# Patient Record
Sex: MALE | Race: AMERICAN INDIAN OR ALASKA NATIVE | NOT HISPANIC OR LATINO | Employment: OTHER | URBAN - METROPOLITAN AREA
[De-identification: names, ages, dates, MRNs, and addresses within clinical notes are randomized per-mention and may not be internally consistent; named-entity substitution may affect disease eponyms.]

---

## 2019-03-09 ENCOUNTER — HOSPITAL ENCOUNTER (INPATIENT)
Facility: HOSPITAL | Age: 54
LOS: 6 days | Discharge: HOME | DRG: 918 | End: 2019-03-15
Attending: INTERNAL MEDICINE | Admitting: INTERNAL MEDICINE
Payer: MEDICARE

## 2019-03-09 DIAGNOSIS — F20.9 SCHIZOPHRENIA, UNSPECIFIED TYPE: ICD-10-CM

## 2019-03-09 DIAGNOSIS — T50.904D DRUG OVERDOSE, UNDETERMINED INTENT, SUBSEQUENT ENCOUNTER: ICD-10-CM

## 2019-03-09 DIAGNOSIS — T14.91XA SUICIDE ATTEMPT: ICD-10-CM

## 2019-03-09 PROBLEM — T65.92XA SUICIDE ATTEMPT BY SUBSTANCE OVERDOSE: Status: ACTIVE | Noted: 2019-03-09

## 2019-03-09 PROBLEM — T50.901A OVERDOSE: Status: ACTIVE | Noted: 2019-03-09

## 2019-03-09 LAB
CHOLEST SERPL-MCNC: 194 MG/DL
CHOLEST/HDLC SERPL: 4 {RATIO}
ESTIMATED AVG GLUCOSE: 114 MG/DL
FOLATE SERPL-MCNC: 10 NG/ML
HBA1C MFR BLD HPLC: 5.6 %
HDLC SERPL-MCNC: 49 MG/DL
HDLC SERPL: 25.3 %
LDLC SERPL CALC-MCNC: 108.2 MG/DL
NONHDLC SERPL-MCNC: 145 MG/DL
T3 SERPL-MCNC: 135 NG/DL
T4 FREE SERPL-MCNC: 1.03 NG/DL
TRIGL SERPL-MCNC: 184 MG/DL
TSH SERPL DL<=0.005 MIU/L-ACNC: 2.41 UIU/ML
VIT B12 SERPL-MCNC: 295 PG/ML

## 2019-03-09 PROCEDURE — 84425 ASSAY OF VITAMIN B-1: CPT

## 2019-03-09 PROCEDURE — 99222 PR INITIAL HOSPITAL CARE,LEVL II: ICD-10-PCS | Mod: AI,GC,, | Performed by: INTERNAL MEDICINE

## 2019-03-09 PROCEDURE — 80061 LIPID PANEL: CPT

## 2019-03-09 PROCEDURE — 84439 ASSAY OF FREE THYROXINE: CPT

## 2019-03-09 PROCEDURE — 82607 VITAMIN B-12: CPT

## 2019-03-09 PROCEDURE — 87110 CHLAMYDIA CULTURE: CPT

## 2019-03-09 PROCEDURE — 12400001 HC PSYCH SEMI-PRIVATE ROOM

## 2019-03-09 PROCEDURE — 87535 HIV-1 PROBE&REVERSE TRNSCRPJ: CPT

## 2019-03-09 PROCEDURE — 84480 ASSAY TRIIODOTHYRONINE (T3): CPT

## 2019-03-09 PROCEDURE — 83036 HEMOGLOBIN GLYCOSYLATED A1C: CPT

## 2019-03-09 PROCEDURE — 82746 ASSAY OF FOLIC ACID SERUM: CPT

## 2019-03-09 PROCEDURE — 99222 1ST HOSP IP/OBS MODERATE 55: CPT | Mod: AI,GC,, | Performed by: INTERNAL MEDICINE

## 2019-03-09 PROCEDURE — 84443 ASSAY THYROID STIM HORMONE: CPT

## 2019-03-09 PROCEDURE — 86592 SYPHILIS TEST NON-TREP QUAL: CPT

## 2019-03-09 RX ORDER — OLANZAPINE 10 MG/2ML
10 INJECTION, POWDER, FOR SOLUTION INTRAMUSCULAR ONCE AS NEEDED
Status: DISCONTINUED | OUTPATIENT
Start: 2019-03-09 | End: 2019-03-15 | Stop reason: HOSPADM

## 2019-03-09 RX ORDER — DOCUSATE SODIUM 100 MG/1
100 CAPSULE, LIQUID FILLED ORAL DAILY PRN
Status: DISCONTINUED | OUTPATIENT
Start: 2019-03-09 | End: 2019-03-15 | Stop reason: HOSPADM

## 2019-03-09 RX ORDER — CALCIUM CARBONATE 200(500)MG
1000 TABLET,CHEWABLE ORAL 3 TIMES DAILY PRN
Status: DISCONTINUED | OUTPATIENT
Start: 2019-03-09 | End: 2019-03-15 | Stop reason: HOSPADM

## 2019-03-09 RX ORDER — IBUPROFEN 200 MG
1 TABLET ORAL DAILY PRN
Status: DISCONTINUED | OUTPATIENT
Start: 2019-03-09 | End: 2019-03-11

## 2019-03-09 RX ORDER — HYDROXYZINE PAMOATE 50 MG/1
50 CAPSULE ORAL NIGHTLY PRN
Status: DISCONTINUED | OUTPATIENT
Start: 2019-03-09 | End: 2019-03-09

## 2019-03-09 RX ORDER — HYDROXYZINE PAMOATE 25 MG/1
25 CAPSULE ORAL EVERY 6 HOURS PRN
Status: DISCONTINUED | OUTPATIENT
Start: 2019-03-09 | End: 2019-03-09

## 2019-03-09 RX ORDER — LOPERAMIDE HYDROCHLORIDE 2 MG/1
2 CAPSULE ORAL 4 TIMES DAILY PRN
Status: DISCONTINUED | OUTPATIENT
Start: 2019-03-09 | End: 2019-03-15 | Stop reason: HOSPADM

## 2019-03-09 RX ORDER — CALCIUM CARBONATE 200(500)MG
1000 TABLET,CHEWABLE ORAL EVERY 8 HOURS PRN
Status: DISCONTINUED | OUTPATIENT
Start: 2019-03-09 | End: 2019-03-09

## 2019-03-09 RX ORDER — ACETAMINOPHEN 325 MG/1
650 TABLET ORAL EVERY 6 HOURS PRN
Status: DISCONTINUED | OUTPATIENT
Start: 2019-03-09 | End: 2019-03-09

## 2019-03-09 RX ORDER — ACETAMINOPHEN 325 MG/1
650 TABLET ORAL EVERY 6 HOURS PRN
Status: DISCONTINUED | OUTPATIENT
Start: 2019-03-09 | End: 2019-03-15 | Stop reason: HOSPADM

## 2019-03-09 RX ORDER — OLANZAPINE 10 MG/1
10 TABLET ORAL EVERY 8 HOURS PRN
Status: DISCONTINUED | OUTPATIENT
Start: 2019-03-09 | End: 2019-03-15 | Stop reason: HOSPADM

## 2019-03-09 RX ORDER — LOPERAMIDE HYDROCHLORIDE 2 MG/1
2 CAPSULE ORAL
Status: DISCONTINUED | OUTPATIENT
Start: 2019-03-09 | End: 2019-03-09

## 2019-03-09 NOTE — SUBJECTIVE & OBJECTIVE
Patient History           Medical as of 3/9/2019     Past Medical History     Diagnosis Date Comments Source    Anxiety -- -- Provider    Arthritis -- -- Provider    Depression -- -- Provider    History of psychiatric hospitalization -- -- Provider    Hx of psychiatric care -- -- Provider    Hypertension -- -- Provider    Psychiatric exam requested by authority -- -- Provider    Psychiatric problem -- -- Provider    Suicide attempt -- -- Provider    Therapy -- -- Provider                  Surgical as of 3/9/2019     Past Surgical History     Procedure Laterality Date Comments Source    MANDIBLE FRACTURE SURGERY -- -- -- Provider                  Family as of 3/9/2019     Problem Relation Name Age of Onset Comments Source    Hyperlipidemia Mother -- -- -- Provider    Arthritis Mother -- -- -- Provider    Heart disease Father -- -- -- Provider    Depression Father -- -- -- Provider    Arthritis Father -- -- -- Provider            Tobacco Use as of 3/9/2019     Smoking Status Smoking Start Date Smoking Quit Date Packs/Day Years Used    Current Every Day Smoker -- -- 1.00 15.00    Types Comments Smokeless Tobacco Status Smokeless Tobacco Quit Date Source     Cigarettes -- Unknown -- Provider            Alcohol Use as of 3/9/2019     Alcohol Use Drinks/Week Alcohol/Week Comments Source    Not Asked -- -- -- Provider    Frequency Standard Drinks Binge Drinking Source      -- -- -- Provider             Drug Use as of 3/9/2019     Drug Use Types Frequency Comments Source    Yes  Amphetamines, Marijuana -- -- Provider            Sexual Activity as of 3/9/2019     Sexually Active Birth Control Partners Comments Source    Not Asked -- -- -- Provider            Activities of Daily Living as of 3/9/2019     Activities of Daily Living Question Response Comments Source    Patient feels they ought to cut down on drinking/drug use No -- Provider    Patient annoyed by others criticizing their drinking/drug use No -- Provider     Patient has felt bad or guilty about drinking/drug use No -- Provider    Patient has had a drink/used drugs as an eye opener in the AM No -- Provider            Social Documentation as of 3/9/2019    None           Occupational as of 3/9/2019    None           Socioeconomic as of 3/9/2019     Marital Status Spouse Name Number of Children Years Education Education Level Preferred Language Ethnicity Race Source     -- -- -- -- English  () or Alaskan  Indian or  Provider    Financial Resource Strain Food Insecurity: Worry Food Insecurity: Inability Transportation Needs: Medical Transportation Needs: Non-medical       -- -- -- -- --             Pertinent History     Question Response Comments    Lives with friends --    Place in Birth Order -- --    Lives in home --    Number of Siblings -- --    Raised by biological parents --    Legal Involvement none --    Childhood Trauma early trauma --    Criminal History of arrest --    Financial Status disabled --    Highest Level of Education unfinished highschool --    Does patient have access to a firearm? -- --     Service No --    Primary Leisure Activity -- --    Spirituality non-practicing --        Past Medical History:   Diagnosis Date    Anxiety     Arthritis     Depression     History of psychiatric hospitalization     Hx of psychiatric care     Hypertension     Psychiatric exam requested by authority     Psychiatric problem     Suicide attempt     Therapy      Past Surgical History:   Procedure Laterality Date    MANDIBLE FRACTURE SURGERY       Family History     Problem Relation (Age of Onset)    Arthritis Mother, Father    Depression Father    Heart disease Father    Hyperlipidemia Mother        Tobacco Use    Smoking status: Current Every Day Smoker     Packs/day: 1.00     Years: 15.00     Pack years: 15.00     Types: Cigarettes   Substance and Sexual Activity    Alcohol use: Not  on file    Drug use: Yes     Types: Amphetamines, Marijuana    Sexual activity: Not on file     Review of patient's allergies indicates:   Allergen Reactions    Lyrica [pregabalin] Hives     And abd pain    Penicillins Hives    Antihistamines - alkylamine Anxiety    Promethazine Anxiety    Vistaril [hydroxyzine hcl] Anxiety       No current facility-administered medications on file prior to encounter.      Current Outpatient Medications on File Prior to Encounter   Medication Sig    alprazolam (XANAX) 0.5 MG tablet Take 0.5 mg by mouth 2 (two) times daily.    atenolol (TENORMIN) 50 MG tablet Take 50 mg by mouth every morning.    fluoxetine (PROZAC) 20 MG capsule Take 20 mg by mouth nightly.    gabapentin (NEURONTIN) 300 MG capsule Take 1 capsule (300 mg total) by mouth 3 (three) times daily.    lisinopril-hydrochlorothiazide (PRINZIDE,ZESTORETIC) 10-12.5 mg per tablet     mirtazapine (REMERON) 15 MG tablet     risperidone (RISPERDAL) 2 MG tablet      Psychotherapeutics (From admission, onward)    None        Review of Systems   Constitutional: Negative for chills and fever.   HENT: Negative for congestion and sore throat.    Eyes: Negative for discharge and visual disturbance.   Respiratory: Negative for cough and shortness of breath.    Cardiovascular: Negative for chest pain and palpitations.   Gastrointestinal: Negative for abdominal pain, diarrhea, nausea and vomiting.   Musculoskeletal: Negative for back pain and neck pain.   Skin: Negative for rash and wound.   Neurological: Negative for dizziness, tremors, weakness and headaches.     Strengths and Liabilities: Strength: Patient has positive support network., Liability: Patient has poor judgment    Objective:     Vital Signs (Most Recent):    Vital Signs (24h Range):  Temp:  [98.2 °F (36.8 °C)] 98.2 °F (36.8 °C)  Pulse:  [80] 80  Resp:  [18] 18  SpO2:  [98 %] 98 %  BP: (129)/(82) 129/82           There is no height or weight on file to calculate  "BMI.    No intake or output data in the 24 hours ending 03/09/19 1131    Physical Exam   Constitutional: He appears well-developed and well-nourished.   HENT:   Head: Normocephalic and atraumatic.   Eyes: EOM are normal. Pupils are equal, round, and reactive to light.   Cardiovascular: Normal rate, regular rhythm, normal heart sounds and intact distal pulses.   Pulmonary/Chest: Effort normal and breath sounds normal.   Abdominal: Soft. Bowel sounds are normal. He exhibits no distension.   Neurological: Gait normal.   Skin: Skin is warm and dry.   Psychiatric:   Mental Status Exam:  Appearance: poor hygiene and grooming, dressed in hospital scrubs, NAD, appears stated age   Behavior/Cooperation: calm, cooperative  Language: fluent english  Speech: rapid, garbled  Mood: "just want to go home"  Affect: full, reactive, appropriate  Thought Process: linear, logical, goal-directed  Thought Content:  denies SI/HI/paranoia/delusions; no objective evidence of paranoia or delusions  Perception: vague AVH  Orientation: grossly intact  Memory: intact to conversation  Attention Span/Concentration: intact to conversation  Fund of Knowledge: appropriate for education level  Insight: limited  Judgment: poor       NEUROLOGICAL EXAMINATION:     CRANIAL NERVES     CN II   Visual fields full to confrontation.     CN III, IV, VI   Pupils are equal, round, and reactive to light.  Extraocular motions are normal.     CN V   Facial sensation intact.     CN VII   Facial expression full, symmetric.     CN VIII   CN VIII normal.     CN IX, X   CN IX normal.   CN X normal.     CN XI   CN XI normal.     CN XII   CN XII normal.     GAIT AND COORDINATION     Gait  Gait: normal    Significant Labs: No results found for this or any previous visit (from the past 48 hour(s)).   No results found for: PHENYTOIN, PHENOBARB, VALPROATE, CBMZ      Significant Imaging: I have reviewed all pertinent imaging results/findings within the past 24 hours.  "
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
## 2019-03-09 NOTE — ASSESSMENT & PLAN NOTE
"Was dx with Schizophrenia about 10 years ago, and reports trial of Haldol, Invega Sustenna, Abilify IM, Risperdal, Zyprexa, Latuda, Geodon, Seroquel but denies any of these meds being efficacious. States that he will takes meds for a few months but then "they feel like poison and the medicine is out to get me". Does reports "jumping and shaking" side effects with some of the medications but is unable to report which specific meds caused this.     On interview, patient reports vague AVH.    Plan to initiate antipsychotic (preferrably MACK due to noncompliance) but will hold for now in light of recent overdose.  "
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
## 2019-03-09 NOTE — PLAN OF CARE
03/09/19 1100   [facility] Group Therapy   Group Name Medication   Participation Level Appropriate   Participation Quality Cooperative   Insight/Motivation Improved   Affect/Mood Display Appropriate   Cognition Alert

## 2019-03-09 NOTE — NURSING
Pt arrives to [facility] with EMS personel. Pt is alert, awake, oriented. He is calm and compliant. He is dressed in a hospital gown. Pt is searched for contraband by [facility], no contraband found. New wrist band and allergy band applied. Pt reports that he has no suicidal intent. He denies SI/HI/AV hallucinations. He reports that he took 30 remeron pills last night to help him sleep. He denies it was a suicide attempt. However, he endorses previous suicide attempt and hx of depression with polysubstance abuse. He reports occasional meth use and daily marijuna use. Pt is educated on policies and procedures. His items are itemized by [facility].  notified of arrival. PEC faxed to 's office. MD notified that pt will require new in [place] PEC.
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
## 2019-03-09 NOTE — PROGRESS NOTES
03/09/19 0900   [facility] Group Therapy   Group Name Therapeutic Recreation   Specific Interventions Social Skills Training   Participation Level None

## 2019-03-09 NOTE — PROGRESS NOTES
03/09/19 1030   [facility] Group Therapy   Group Name Therapeutic Recreation   Specific Interventions Cognitive Stimulation Training   Participation Level None
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
## 2019-03-10 PROCEDURE — 99232 PR SUBSEQUENT HOSPITAL CARE,LEVL II: ICD-10-PCS | Mod: GC,,, | Performed by: INTERNAL MEDICINE

## 2019-03-10 PROCEDURE — 25000003 PHARM REV CODE 250: Performed by: PSYCHIATRY & NEUROLOGY

## 2019-03-10 PROCEDURE — 12400001 HC PSYCH SEMI-PRIVATE ROOM

## 2019-03-10 PROCEDURE — 99232 SBSQ HOSP IP/OBS MODERATE 35: CPT | Mod: GC,,, | Performed by: INTERNAL MEDICINE

## 2019-03-10 PROCEDURE — 87491 CHLMYD TRACH DNA AMP PROBE: CPT

## 2019-03-10 RX ORDER — ARIPIPRAZOLE 10 MG/1
10 TABLET ORAL NIGHTLY
Status: DISCONTINUED | OUTPATIENT
Start: 2019-03-10 | End: 2019-03-15 | Stop reason: HOSPADM

## 2019-03-10 RX ADMIN — ARIPIPRAZOLE 10 MG: 10 TABLET ORAL at 08:03

## 2019-03-10 RX ADMIN — THERA TABS 1 TABLET: TAB at 08:03

## 2019-03-10 NOTE — PROGRESS NOTES
03/10/19 0900 03/10/19 1000 03/10/19 1100   [facility] Group Therapy   Group Name Community Reintegration Mental Awareness Stress Management   Specific Interventions Current Events Cognitive Stimulation Training Guided Imagery/Relaxation   Participation Level Minimal None Active;Supportive;Appropriate   Participation Quality Cooperative;Requires Prompting Withdrawn Cooperative   Insight/Motivation Improved --  Improved   Affect/Mood Display Appropriate --  Appropriate   Cognition Alert --  Alert       03/10/19 1200 03/10/19 1400   [facility] Group Therapy   Group Name Education Therapeutic Recreation   Specific Interventions --  Skilled Activity Creative Expression   Participation Level Appropriate Active;Appropriate   Participation Quality Cooperative Cooperative;Social   Insight/Motivation Limited Improved   Affect/Mood Display Appropriate Appropriate   Cognition Alert Alert
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
## 2019-03-10 NOTE — PLAN OF CARE
03/10/19 1200   [facility] Group Therapy   Group Name Education   Participation Level Appropriate   Participation Quality Cooperative   Insight/Motivation Limited   Affect/Mood Display Appropriate   Cognition Alert
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
## 2019-03-10 NOTE — SUBJECTIVE & OBJECTIVE
Patient History           Medical as of 3/10/2019     Past Medical History     Diagnosis Date Comments Source    Anxiety -- -- Provider    Arthritis -- -- Provider    Depression -- -- Provider    History of psychiatric hospitalization -- -- Provider    Hx of psychiatric care -- -- Provider    Hypertension -- -- Provider    Psychiatric exam requested by authority -- -- Provider    Psychiatric problem -- -- Provider    Suicide attempt -- -- Provider    Therapy -- -- Provider                  Surgical as of 3/10/2019     Past Surgical History     Procedure Laterality Date Comments Source    MANDIBLE FRACTURE SURGERY -- -- -- Provider                  Family as of 3/10/2019     Problem Relation Name Age of Onset Comments Source    Hyperlipidemia Mother -- -- -- Provider    Arthritis Mother -- -- -- Provider    Heart disease Father -- -- -- Provider    Depression Father -- -- -- Provider    Arthritis Father -- -- -- Provider            Tobacco Use as of 3/10/2019     Smoking Status Smoking Start Date Smoking Quit Date Packs/Day Years Used    Current Every Day Smoker -- -- 1.00 15.00    Types Comments Smokeless Tobacco Status Smokeless Tobacco Quit Date Source     Cigarettes -- Unknown -- Provider            Alcohol Use as of 3/10/2019     Alcohol Use Drinks/Week Alcohol/Week Comments Source    Not Asked -- -- -- Provider    Frequency Standard Drinks Binge Drinking Source      -- -- -- Provider             Drug Use as of 3/10/2019     Drug Use Types Frequency Comments Source    Yes  Amphetamines, Marijuana -- -- Provider            Sexual Activity as of 3/10/2019     Sexually Active Birth Control Partners Comments Source    Not Asked -- -- -- Provider            Activities of Daily Living as of 3/10/2019     Activities of Daily Living Question Response Comments Source    Patient feels they ought to cut down on drinking/drug use No -- Provider    Patient annoyed by others criticizing their drinking/drug use No --  Provider    Patient has felt bad or guilty about drinking/drug use No -- Provider    Patient has had a drink/used drugs as an eye opener in the AM No -- Provider            Social Documentation as of 3/10/2019    None           Occupational as of 3/10/2019    None           Socioeconomic as of 3/10/2019     Marital Status Spouse Name Number of Children Years Education Education Level Preferred Language Ethnicity Race Source     -- -- -- -- English  () or Alaskan  Indian or  Provider    Financial Resource Strain Food Insecurity: Worry Food Insecurity: Inability Transportation Needs: Medical Transportation Needs: Non-medical       -- -- -- -- --             Pertinent History     Question Response Comments    Lives with friends --    Place in Birth Order -- --    Lives in home --    Number of Siblings -- --    Raised by biological parents --    Legal Involvement none --    Childhood Trauma early trauma --    Criminal History of arrest --    Financial Status disabled --    Highest Level of Education unfinished highschool --    Does patient have access to a firearm? -- --     Service No --    Primary Leisure Activity -- --    Spirituality non-practicing --        Past Medical History:   Diagnosis Date    Anxiety     Arthritis     Depression     History of psychiatric hospitalization     Hx of psychiatric care     Hypertension     Psychiatric exam requested by authority     Psychiatric problem     Suicide attempt     Therapy      Past Surgical History:   Procedure Laterality Date    MANDIBLE FRACTURE SURGERY       Family History     Problem Relation (Age of Onset)    Arthritis Mother, Father    Depression Father    Heart disease Father    Hyperlipidemia Mother        Tobacco Use    Smoking status: Current Every Day Smoker     Packs/day: 1.00     Years: 15.00     Pack years: 15.00     Types: Cigarettes   Substance and Sexual Activity     Alcohol use: Not on file    Drug use: Yes     Types: Amphetamines, Marijuana    Sexual activity: Not on file     Review of patient's allergies indicates:   Allergen Reactions    Lyrica [pregabalin] Hives     And abd pain    Penicillins Hives    Antihistamines - alkylamine Anxiety    Promethazine Anxiety    Vistaril [hydroxyzine hcl] Anxiety       No current facility-administered medications on file prior to encounter.      Current Outpatient Medications on File Prior to Encounter   Medication Sig    alprazolam (XANAX) 0.5 MG tablet Take 0.5 mg by mouth 2 (two) times daily.    atenolol (TENORMIN) 50 MG tablet Take 50 mg by mouth every morning.    fluoxetine (PROZAC) 20 MG capsule Take 20 mg by mouth nightly.    gabapentin (NEURONTIN) 300 MG capsule Take 1 capsule (300 mg total) by mouth 3 (three) times daily.    lisinopril-hydrochlorothiazide (PRINZIDE,ZESTORETIC) 10-12.5 mg per tablet     mirtazapine (REMERON) 15 MG tablet     risperidone (RISPERDAL) 2 MG tablet      Psychotherapeutics (From admission, onward)    Start     Stop Route Frequency Ordered    03/09/19 1411  OLANZapine injection 10 mg      08/05 0611 IM Once as needed 03/09/19 1313    03/09/19 1411  OLANZapine tablet 10 mg      -- Oral Every 8 hours PRN 03/09/19 1313        Review of Systems   Constitutional: Negative for appetite change.   Eyes: Negative for discharge.   Gastrointestinal: Negative for vomiting.   Musculoskeletal: Negative for back pain and neck pain.   Skin: Negative for rash and wound.   Neurological: Positive for weakness. Negative for headaches.        Chronic weakness in arms     Strengths and Liabilities: Strength: Patient has positive support network., Liability: Patient has poor judgment    Objective:     Vital Signs (Most Recent):  Temp: 98.2 °F (36.8 °C) (03/10/19 0738)  Pulse: 75 (03/10/19 0738)  Resp: 16 (03/10/19 0738)  BP: (!) 137/90 (03/10/19 0738) Vital Signs (24h Range):  Temp:  [97.9 °F (36.6 °C)-98.2 °F  "(36.8 °C)] 98.2 °F (36.8 °C)  Pulse:  [] 75  Resp:  [16-18] 16  BP: (130-155)/(87-96) 137/90     Height: 5' 10" (177.8 cm)  Weight: 62.3 kg (137 lb 5.6 oz)  Body mass index is 19.71 kg/m².    No intake or output data in the 24 hours ending 03/10/19 1018    Physical Exam   Constitutional: He appears well-developed and well-nourished.   HENT:   Head: Normocephalic and atraumatic.   Psychiatric:   Mental Status Exam:  Appearance: poor hygiene and grooming, dressed in hospital scrubs, NAD, appears stated age   Behavior/Cooperation: calm, cooperative  Language: fluent english  Speech: normal rate, garbled at times  Mood: "just want to go home"  Affect: full, reactive, appropriate  Thought Process: linear, logical, goal-directed  Thought Content:  denies SI/HI/paranoia/delusions; no objective evidence of paranoia or delusions  Perception: denies AVH. No objective evidence of AVH  Orientation: grossly intact  Memory: intact to conversation  Attention Span/Concentration: intact to conversation  Fund of Knowledge: appropriate for education level  Insight: limited  Judgment: limited          Significant Labs:   Recent Results (from the past 48 hour(s))   T4, free    Collection Time: 03/09/19 11:44 AM   Result Value Ref Range    Free T4 1.03 0.71 - 1.51 ng/dL   T3    Collection Time: 03/09/19 11:44 AM   Result Value Ref Range    T3, Total 135 60 - 180 ng/dL   Folate    Collection Time: 03/09/19 11:44 AM   Result Value Ref Range    Folate 10.0 4.0 - 24.0 ng/mL   TSH    Collection Time: 03/09/19 11:44 AM   Result Value Ref Range    TSH 2.413 0.400 - 4.000 uIU/mL   Vitamin B12    Collection Time: 03/09/19 11:44 AM   Result Value Ref Range    Vitamin B-12 295 210 - 950 pg/mL   Hemoglobin A1c    Collection Time: 03/09/19 11:47 AM   Result Value Ref Range    Hemoglobin A1C 5.6 4.0 - 5.6 %    Estimated Avg Glucose 114 68 - 131 mg/dL   Lipid panel    Collection Time: 03/09/19 11:47 AM   Result Value Ref Range    Cholesterol 194 " 120 - 199 mg/dL    Triglycerides 184 (H) 30 - 150 mg/dL    HDL 49 40 - 75 mg/dL    LDL Cholesterol 108.2 63.0 - 159.0 mg/dL    HDL/Chol Ratio 25.3 20.0 - 50.0 %    Total Cholesterol/HDL Ratio 4.0 2.0 - 5.0    Non-HDL Cholesterol 145 mg/dL      No results found for: PHENYTOIN, PHENOBARB, VALPROATE, CBMZ      Significant Imaging: I have reviewed all pertinent imaging results/findings within the past 24 hours.

## 2019-03-10 NOTE — ASSESSMENT & PLAN NOTE
"Was dx with Schizophrenia about 10 years ago, and reports trial of Haldol, Invega Sustenna, Abilify IM, Risperdal, Zyprexa, Latuda, Geodon, Seroquel but denies any of these meds being efficacious. States that he will takes meds for a few months but then "they feel like poison and the medicine is out to get me". Does reports "jumping and shaking" side effects with some of the medications but is unable to report which specific meds caused this.     On interview, patient reports vague AVH.    -start abilify 10 mg qhs (3/10) as pt is  Not antispsychotic naive. PT is not interested in MACK at this time.  "
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
## 2019-03-10 NOTE — PLAN OF CARE
Pt irritable and frustrated throughout shift. Pt expressed frustration about not being able to fill out his daily menu; this RN offered assistance with filling it out. Pt also irritated that he did not know how to dial the phone; this RN offered assistance with dialing the phone. Pt stated that he is tired of telling staff to help him open his drinks at meal times. Pt was encouraged to continue to make his needs known to staff. Pt was often observed cursing to himself in his room, although pt denies AH.VH. Denies SI/HI. NO physical complaints voiced. NAD observed. Will cont to monitor.

## 2019-03-11 LAB
AMPHET+METHAMPHET UR QL: NEGATIVE
BARBITURATES UR QL SCN>200 NG/ML: NEGATIVE
BENZODIAZ UR QL SCN>200 NG/ML: NEGATIVE
BZE UR QL SCN: NEGATIVE
C TRACH DNA SPEC QL NAA+PROBE: NOT DETECTED
CANNABINOIDS UR QL SCN: NORMAL
CREAT UR-MCNC: 52 MG/DL
ETHANOL UR-MCNC: <10 MG/DL
HIV1+2 IGG SERPL QL IA.RAPID: NEGATIVE
METHADONE UR QL SCN>300 NG/ML: NEGATIVE
N GONORRHOEA DNA SPEC QL NAA+PROBE: NOT DETECTED
OPIATES UR QL SCN: NEGATIVE
PCP UR QL SCN>25 NG/ML: NEGATIVE
RPR SER QL: NORMAL
TOXICOLOGY INFORMATION: NORMAL

## 2019-03-11 PROCEDURE — 99232 PR SUBSEQUENT HOSPITAL CARE,LEVL II: ICD-10-PCS | Mod: GC,,, | Performed by: PSYCHIATRY & NEUROLOGY

## 2019-03-11 PROCEDURE — 90853 PR GROUP PSYCHOTHERAPY: ICD-10-PCS | Mod: ,,, | Performed by: PSYCHOLOGIST

## 2019-03-11 PROCEDURE — 12400001 HC PSYCH SEMI-PRIVATE ROOM

## 2019-03-11 PROCEDURE — 90853 GROUP PSYCHOTHERAPY: CPT | Mod: ,,, | Performed by: PSYCHOLOGIST

## 2019-03-11 PROCEDURE — 99232 SBSQ HOSP IP/OBS MODERATE 35: CPT | Mod: GC,,, | Performed by: PSYCHIATRY & NEUROLOGY

## 2019-03-11 PROCEDURE — 25000003 PHARM REV CODE 250: Performed by: PSYCHIATRY & NEUROLOGY

## 2019-03-11 PROCEDURE — 36415 COLL VENOUS BLD VENIPUNCTURE: CPT

## 2019-03-11 PROCEDURE — 87491 CHLMYD TRACH DNA AMP PROBE: CPT

## 2019-03-11 PROCEDURE — 80307 DRUG TEST PRSMV CHEM ANLYZR: CPT

## 2019-03-11 PROCEDURE — 63600175 PHARM REV CODE 636 W HCPCS: Performed by: PSYCHIATRY & NEUROLOGY

## 2019-03-11 PROCEDURE — 86703 HIV-1/HIV-2 1 RESULT ANTBDY: CPT

## 2019-03-11 RX ORDER — CYANOCOBALAMIN 1000 UG/ML
1000 INJECTION, SOLUTION INTRAMUSCULAR; SUBCUTANEOUS ONCE
Status: COMPLETED | OUTPATIENT
Start: 2019-03-11 | End: 2019-03-11

## 2019-03-11 RX ADMIN — ARIPIPRAZOLE 10 MG: 10 TABLET ORAL at 09:03

## 2019-03-11 RX ADMIN — THERA TABS 1 TABLET: TAB at 08:03

## 2019-03-11 RX ADMIN — CYANOCOBALAMIN 1000 MCG: 1000 INJECTION, SOLUTION INTRAMUSCULAR; SUBCUTANEOUS at 04:03

## 2019-03-11 NOTE — PLAN OF CARE
03/11/19 1500   [facility] Group Therapy   Group Name Education   Specific Interventions Coping Skills Training   Participation Level Appropriate   Participation Quality Cooperative   Insight/Motivation Good   Affect/Mood Display Appropriate   Cognition Alert

## 2019-03-11 NOTE — PLAN OF CARE
Problem: Adult Behavioral Health Plan of Care  Goal: Plan of Care Review  Outcome: Ongoing (interventions implemented as appropriate)  Patient sitting up in group therapy. Patient alert and oriented. Patient safety maintained. Will continue to monitor.

## 2019-03-11 NOTE — ASSESSMENT & PLAN NOTE
Reports overdose on 30 Remeron 30mg tablets. States that he tooks all of these medications with the intent to fall asleep. Denies feeling suicidal at this time. Does report previous suicide attempt by shooting/cutting self, resulting in a psych hospitalization. Although patient is not actively suicidal on interview, continue PEC/CEC for danger to self as patient was aware of the risk of death by the overdose.
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
## 2019-03-11 NOTE — PROGRESS NOTES
"Group Psychotherapy (PhD/LCSW)    Site: [facility]    Clinical status of patient: Inpatient    Date: 3/11/2019    Group Focus: Life Skills    Length of service: 52916 - 35-40 minutes    Number of patients in attendance: 7    Referred by: Acute Psychiatry Unit Treatment Team    Target symptoms: Psychosis    Patient's response to treatment: Active Listening and Self-disclosure    Progress toward goals: Progressing slowly    Interval History: Pt appeared alert and attentive in group. Pt participated briefly, appropriately in a group discussion of coping with events and things in life that are perceived as "unfair". Pt discussed the difficulty in adjusting to being disabled after having a high paying job.     Diagnosis: Schizophrenia, unspecified     Plan: Continue treatment on [facility]        "

## 2019-03-11 NOTE — ASSESSMENT & PLAN NOTE
"Was dx with Schizophrenia about 10 years ago, and reports trial of Haldol, Invega Sustenna, Abilify IM, Risperdal, Zyprexa, Latuda, Geodon, Seroquel but denies any of these meds being efficacious. States that he will takes meds for a few months but then "they feel like poison and the medicine is out to get me". Does reports "jumping and shaking" side effects with some of the medications but is unable to report which specific meds caused this.     On interview, patient denies psychotic symptoms on 3/11.     -Continue abilify 10 mg qhs, home rx, PT is not interested in MACK at this time.  -B12 low, will start tx with 1000mcg IM now, supplement with po   "

## 2019-03-11 NOTE — SUBJECTIVE & OBJECTIVE
Patient History           Medical as of 3/11/2019     Past Medical History     Diagnosis Date Comments Source    Anxiety -- -- Provider    Arthritis -- -- Provider    Depression -- -- Provider    History of psychiatric hospitalization -- -- Provider    Hx of psychiatric care -- -- Provider    Hypertension -- -- Provider    Psychiatric exam requested by authority -- -- Provider    Psychiatric problem -- -- Provider    Suicide attempt -- -- Provider    Therapy -- -- Provider                  Surgical as of 3/11/2019     Past Surgical History     Procedure Laterality Date Comments Source    MANDIBLE FRACTURE SURGERY -- -- -- Provider                  Family as of 3/11/2019     Problem Relation Name Age of Onset Comments Source    Hyperlipidemia Mother -- -- -- Provider    Arthritis Mother -- -- -- Provider    Heart disease Father -- -- -- Provider    Depression Father -- -- -- Provider    Arthritis Father -- -- -- Provider            Tobacco Use as of 3/11/2019     Smoking Status Smoking Start Date Smoking Quit Date Packs/Day Years Used    Current Every Day Smoker -- -- 1.00 15.00    Types Comments Smokeless Tobacco Status Smokeless Tobacco Quit Date Source     Cigarettes -- Unknown -- Provider            Alcohol Use as of 3/11/2019     Alcohol Use Drinks/Week Alcohol/Week Comments Source    Not Asked -- -- -- Provider    Frequency Standard Drinks Binge Drinking Source      -- -- -- Provider             Drug Use as of 3/11/2019     Drug Use Types Frequency Comments Source    Yes  Amphetamines, Marijuana -- -- Provider            Sexual Activity as of 3/11/2019     Sexually Active Birth Control Partners Comments Source    Not Asked -- -- -- Provider            Activities of Daily Living as of 3/11/2019     Activities of Daily Living Question Response Comments Source    Patient feels they ought to cut down on drinking/drug use No -- Provider    Patient annoyed by others criticizing their drinking/drug use No --  Provider    Patient has felt bad or guilty about drinking/drug use No -- Provider    Patient has had a drink/used drugs as an eye opener in the AM No -- Provider            Social Documentation as of 3/11/2019    None           Occupational as of 3/11/2019    None           Socioeconomic as of 3/11/2019     Marital Status Spouse Name Number of Children Years Education Education Level Preferred Language Ethnicity Race Source     -- -- -- -- English  () or Alaskan  Indian or  Provider    Financial Resource Strain Food Insecurity: Worry Food Insecurity: Inability Transportation Needs: Medical Transportation Needs: Non-medical       -- -- -- -- --             Pertinent History     Question Response Comments    Lives with friends --    Place in Birth Order -- --    Lives in home --    Number of Siblings -- --    Raised by biological parents --    Legal Involvement none --    Childhood Trauma early trauma --    Criminal History of arrest --    Financial Status disabled --    Highest Level of Education unfinished highschool --    Does patient have access to a firearm? -- --     Service No --    Primary Leisure Activity -- --    Spirituality non-practicing --        Past Medical History:   Diagnosis Date    Anxiety     Arthritis     Depression     History of psychiatric hospitalization     Hx of psychiatric care     Hypertension     Psychiatric exam requested by authority     Psychiatric problem     Suicide attempt     Therapy      Past Surgical History:   Procedure Laterality Date    MANDIBLE FRACTURE SURGERY       Family History     Problem Relation (Age of Onset)    Arthritis Mother, Father    Depression Father    Heart disease Father    Hyperlipidemia Mother        Tobacco Use    Smoking status: Current Every Day Smoker     Packs/day: 1.00     Years: 15.00     Pack years: 15.00     Types: Cigarettes   Substance and Sexual Activity     Alcohol use: Not on file    Drug use: Yes     Types: Amphetamines, Marijuana    Sexual activity: Not on file     Review of patient's allergies indicates:   Allergen Reactions    Lyrica [pregabalin] Hives     And abd pain    Penicillins Hives    Antihistamines - alkylamine Anxiety    Promethazine Anxiety    Vistaril [hydroxyzine hcl] Anxiety       No current facility-administered medications on file prior to encounter.      Current Outpatient Medications on File Prior to Encounter   Medication Sig    alprazolam (XANAX) 0.5 MG tablet Take 0.5 mg by mouth 2 (two) times daily.    atenolol (TENORMIN) 50 MG tablet Take 50 mg by mouth every morning.    fluoxetine (PROZAC) 20 MG capsule Take 20 mg by mouth nightly.    gabapentin (NEURONTIN) 300 MG capsule Take 1 capsule (300 mg total) by mouth 3 (three) times daily.    lisinopril-hydrochlorothiazide (PRINZIDE,ZESTORETIC) 10-12.5 mg per tablet     mirtazapine (REMERON) 15 MG tablet     risperidone (RISPERDAL) 2 MG tablet      Psychotherapeutics (From admission, onward)    Start     Stop Route Frequency Ordered    03/10/19 2100  ARIPiprazole tablet 10 mg      -- Oral Nightly 03/10/19 1023    03/09/19 1411  OLANZapine injection 10 mg      08/05 0611 IM Once as needed 03/09/19 1313    03/09/19 1411  OLANZapine tablet 10 mg      -- Oral Every 8 hours PRN 03/09/19 1313        Review of Systems   Constitutional: Negative for appetite change.   Eyes: Negative for discharge.   Gastrointestinal: Negative for vomiting.   Musculoskeletal: Negative for back pain and neck pain.   Skin: Negative for rash and wound.   Neurological: Negative for weakness and headaches.        Chronic weakness in arms     Strengths and Liabilities: Strength: Patient has positive support network., Liability: Patient has poor judgment    Objective:     Vital Signs (Most Recent):  Temp: 98.4 °F (36.9 °C) (03/11/19 0740)  Pulse: 95 (03/11/19 0740)  Resp: 18 (03/11/19 0740)  BP: 138/87 (03/11/19  "0740) Vital Signs (24h Range):  Temp:  [98.3 °F (36.8 °C)-98.4 °F (36.9 °C)] 98.4 °F (36.9 °C)  Pulse:  [91-95] 95  Resp:  [18] 18  BP: (129-138)/(82-87) 138/87     Height: 5' 10" (177.8 cm)  Weight: 62.3 kg (137 lb 5.6 oz)  Body mass index is 19.71 kg/m².    No intake or output data in the 24 hours ending 03/11/19 1609    Physical Exam   Constitutional: He appears well-developed and well-nourished.   HENT:   Head: Normocephalic and atraumatic.   Psychiatric:   Mental Status Exam:  Appearance: fair hygiene and grooming, casual cltohes   Behavior/Cooperation: calm, cooperative  Language: fluent english  Speech: normal rate, tone, volume   Mood: "fine"  Affect: full, reactive   Thought Process: linear, logical, goal-directed  Thought Content:  denies SI/HI/paranoia/delusions; no objective evidence of paranoia or delusions  Perception: denies AVH. No objective evidence of AVH  Orientation: grossly intact  Memory: intact to conversation  Attention Span/Concentration: intact to conversation  Fund of Knowledge: appropriate for education level  Insight: limited  Judgment: limited     Nursing note and vitals reviewed.         Significant Labs:   Recent Results (from the past 48 hour(s))   C. trachomatis/N. gonorrhoeae by AMP DNA [facility]; Urine    Collection Time: 03/10/19 10:47 AM   Result Value Ref Range    Chlamydia, Amplified DNA Not Detected Not Detected    N gonorrhoeae, amplified DNA Not Detected Not Detected   Rapid HIV    Collection Time: 03/11/19  7:40 AM   Result Value Ref Range    HIV Rapid Testing Negative Negative      No results found for: PHENYTOIN, PHENOBARB, VALPROATE, CBMZ      Significant Imaging: I have reviewed all pertinent imaging results/findings within the past 24 hours.  "

## 2019-03-11 NOTE — PROGRESS NOTES
Attempted to contact patient's mother but there was no answer. Will make another attempt at a later time. Also attempted to contact daughter Shirley but there was no answer.

## 2019-03-11 NOTE — PROGRESS NOTES
03/10/19 2000   [facility] Group Therapy   Group Name Community Reintegration   Specific Interventions Relapse Prevention   Participation Level Appropriate   Participation Quality Cooperative   Insight/Motivation Limited   Affect/Mood Display Appropriate   Cognition Alert

## 2019-03-11 NOTE — PROGRESS NOTES
03/11/19 0900 03/11/19 1000 03/11/19 1100   [facility] Group Therapy   Group Name Community Reintegration Education Education   Specific Interventions Current Events Relapse Prevention Guided Imagery/Relaxation   Participation Level Active;Appropriate Active;Appropriate Active;Appropriate   Participation Quality Cooperative Cooperative;Social Cooperative   Insight/Motivation Limited Good Good   Affect/Mood Display Appropriate Appropriate Appropriate   Cognition Alert Alert Alert       03/11/19 1300   [facility] Group Therapy   Group Name Therapeutic Recreation   Specific Interventions Skilled Activity Crafts   Participation Level --    Participation Quality Refused;Drowsy   Insight/Motivation --    Affect/Mood Display --    Cognition --
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
## 2019-03-12 LAB
C TRACH DNA SPEC QL NAA+PROBE: NOT DETECTED
N GONORRHOEA DNA SPEC QL NAA+PROBE: NOT DETECTED

## 2019-03-12 PROCEDURE — 25000003 PHARM REV CODE 250: Performed by: PSYCHIATRY & NEUROLOGY

## 2019-03-12 PROCEDURE — 99232 SBSQ HOSP IP/OBS MODERATE 35: CPT | Mod: GC,,, | Performed by: PSYCHIATRY & NEUROLOGY

## 2019-03-12 PROCEDURE — 12400001 HC PSYCH SEMI-PRIVATE ROOM

## 2019-03-12 PROCEDURE — 90853 GROUP PSYCHOTHERAPY: CPT | Mod: ,,, | Performed by: PSYCHOLOGIST

## 2019-03-12 PROCEDURE — 99232 PR SUBSEQUENT HOSPITAL CARE,LEVL II: ICD-10-PCS | Mod: GC,,, | Performed by: PSYCHIATRY & NEUROLOGY

## 2019-03-12 PROCEDURE — 90853 PR GROUP PSYCHOTHERAPY: ICD-10-PCS | Mod: ,,, | Performed by: PSYCHOLOGIST

## 2019-03-12 RX ADMIN — ARIPIPRAZOLE 10 MG: 10 TABLET ORAL at 08:03

## 2019-03-12 RX ADMIN — THERA TABS 1 TABLET: TAB at 08:03

## 2019-03-12 RX ADMIN — FOLIC ACID-PYRIDOXINE-CYANOCOBALAMIN TAB 2.5-25-2 MG 1 TABLET: 2.5-25-2 TAB at 08:03
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
## 2019-03-12 NOTE — PROGRESS NOTES
03/12/19 0900 03/12/19 1000 03/12/19 1100   [facility] Group Therapy   Group Name Community Reintegration Mental Awareness Education   Specific Interventions Current Events Cognitive Stimulation Training Guided Imagery/Relaxation   Participation Level Minimal;Appropriate --  --    Participation Quality Cooperative Refused Refused   Insight/Motivation Good --  --    Affect/Mood Display Appropriate Irritable Irritable   Cognition Alert --  --        03/12/19 1200   [facility] Group Therapy   Group Name Therapeutic Recreation   Specific Interventions Skilled Activity Crafts   Participation Level --    Participation Quality Refused   Insight/Motivation --    Affect/Mood Display Irritable   Cognition --

## 2019-03-12 NOTE — PROGRESS NOTES
03/12/19 1546   [facility] Group Therapy   Group Name Exercise   Specific Interventions Skilled Activity Mild Exercises   Participation Level None   Participation Quality Lack of Interest   Insight/Motivation None   Affect/Mood Display Appropriate   Cognition Alert
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
## 2019-03-12 NOTE — PROGRESS NOTES
03/11/19 2000   [facility] Group Therapy   Group Name Community Reintegration   Specific Interventions Current Events   Participation Level None   Participation Quality Services   Insight/Motivation None

## 2019-03-12 NOTE — SUBJECTIVE & OBJECTIVE
"Interval History: See hospital course    Family History     Problem Relation (Age of Onset)    Arthritis Mother, Father    Depression Father    Heart disease Father    Hyperlipidemia Mother        Tobacco Use    Smoking status: Current Every Day Smoker     Packs/day: 1.00     Years: 15.00     Pack years: 15.00     Types: Cigarettes   Substance and Sexual Activity    Alcohol use: Not on file    Drug use: Yes     Types: Amphetamines, Marijuana    Sexual activity: Not on file     Psychotherapeutics (From admission, onward)    Start     Stop Route Frequency Ordered    03/10/19 2100  ARIPiprazole tablet 10 mg      -- Oral Nightly 03/10/19 1023    03/09/19 1411  OLANZapine injection 10 mg      08/05 0611 IM Once as needed 03/09/19 1313    03/09/19 1411  OLANZapine tablet 10 mg      -- Oral Every 8 hours PRN 03/09/19 1313           Review of Systems   Constitutional: Negative for appetite change and fever.   HENT: Negative for sore throat.    Respiratory: Negative for cough and shortness of breath.    Cardiovascular: Negative for chest pain and palpitations.   Gastrointestinal: Negative for abdominal pain, constipation and diarrhea.   Musculoskeletal: Positive for arthralgias and back pain.   Skin: Negative for rash.   Neurological: Negative for headaches.   Psychiatric/Behavioral: Negative for hallucinations, sleep disturbance and suicidal ideas.     Objective:     Vital Signs (Most Recent):  Temp: 98 °F (36.7 °C) (03/12/19 0731)  Pulse: 98 (03/12/19 0731)  Resp: 16 (03/12/19 0731)  BP: 133/79 (03/12/19 0731) Vital Signs (24h Range):  Temp:  [97.9 °F (36.6 °C)-98 °F (36.7 °C)] 98 °F (36.7 °C)  Pulse:  [75-98] 98  Resp:  [16-18] 16  BP: (133-153)/(79-95) 133/79     Height: 5' 10" (177.8 cm)  Weight: 62.3 kg (137 lb 5.6 oz)  Body mass index is 19.71 kg/m².    No intake or output data in the 24 hours ending 03/12/19 1011    Physical Exam   Psychiatric:   Mental Status Exam:  Arousal: alert  Sensorium/Orientation: grossly " "intact  Behavior/Cooperation: normal, cooperative   Psychomotor: unremarkable and within normal limits  Speech: conversational rate, tone, and volume  Language: answered questions appropriately  Mood: "alright"   Affect: Appropriate and reactive to content  Thought Process: linear, logical  Thought Content:   Auditory hallucinations: NO  Visual hallucinations: NO  Paranoia: NO  Delusions:  NO  Suicidal ideation: NO  Homicidal ideation: NO  Attention/Concentration:  intact  able to focus  Memory: grossly intact     Insight: improving  Judgment: improving         Significant Labs:   Utox positive for THC only    Significant Imaging: None  "

## 2019-03-12 NOTE — PLAN OF CARE
03/12/19 1500   [facility] Group Therapy   Group Name Education   Specific Interventions Coping Skills Training   Participation Level Active;Sharing   Participation Quality Cooperative   Insight/Motivation Good   Affect/Mood Display Appropriate   Cognition Alert
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
## 2019-03-12 NOTE — PROGRESS NOTES
Group Psychotherapy (PhD/LCSW)    Site: [facility]    Clinical status of patient: Inpatient    Date: 3/12/2019    Group Focus: Life Skills    Length of service: 35583 - 35-40 minutes    Number of patients in attendance: 7    Referred by: Acute Psychiatry Unit Treatment Team    Target symptoms: Psychosis    Patient's response to treatment: Active Listening and Self-disclosure    Progress toward goals: Progressing slowly    Interval History: Pt appeared alert and attentive in group. Pt participated briefly, appropriately when prompted  in a group discussion of the danger of unrealistic expectations when they are excessively high or excessively low.    Diagnosis: Schizophrenia, unspecified     Plan: Continue treatment on APU
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
## 2019-03-12 NOTE — ASSESSMENT & PLAN NOTE
"Was dx with Schizophrenia about 10 years ago, and reports trial of Haldol, Invega Sustenna, Abilify IM, Risperdal, Zyprexa, Latuda, Geodon, Seroquel but denies any of these meds being efficacious. States that he will takes meds for a few months but then "they feel like poison and the medicine is out to get me". Does reports "jumping and shaking" side effects with some of the medications but is unable to report which specific meds caused this.     On interview, patient denies psychotic symptoms since admission to the hospital.     - Continue Abilify 10 mg qhs, home rx; patient is not interested in MACK at this time.  - B12 low, given 1000mcg IM, supplement with PO Folbic   "

## 2019-03-13 LAB — HIV 1 RNA+PROVIRAL DNA PLAS QL NAA+PROBE: NORMAL

## 2019-03-13 PROCEDURE — 25000003 PHARM REV CODE 250: Performed by: PSYCHIATRY & NEUROLOGY

## 2019-03-13 PROCEDURE — 97165 OT EVAL LOW COMPLEX 30 MIN: CPT

## 2019-03-13 PROCEDURE — 90853 PR GROUP PSYCHOTHERAPY: ICD-10-PCS | Mod: ,,, | Performed by: PSYCHOLOGIST

## 2019-03-13 PROCEDURE — 99232 SBSQ HOSP IP/OBS MODERATE 35: CPT | Mod: GC,,, | Performed by: PSYCHIATRY & NEUROLOGY

## 2019-03-13 PROCEDURE — 99232 PR SUBSEQUENT HOSPITAL CARE,LEVL II: ICD-10-PCS | Mod: GC,,, | Performed by: PSYCHIATRY & NEUROLOGY

## 2019-03-13 PROCEDURE — 90853 GROUP PSYCHOTHERAPY: CPT | Mod: ,,, | Performed by: PSYCHOLOGIST

## 2019-03-13 PROCEDURE — 12400001 HC PSYCH SEMI-PRIVATE ROOM

## 2019-03-13 PROCEDURE — 97150 GROUP THERAPEUTIC PROCEDURES: CPT

## 2019-03-13 RX ADMIN — ARIPIPRAZOLE 10 MG: 10 TABLET ORAL at 08:03

## 2019-03-13 RX ADMIN — THERA TABS 1 TABLET: TAB at 08:03

## 2019-03-13 RX ADMIN — FOLIC ACID-PYRIDOXINE-CYANOCOBALAMIN TAB 2.5-25-2 MG 1 TABLET: 2.5-25-2 TAB at 08:03

## 2019-03-13 NOTE — PLAN OF CARE
03/13/19 1500   [facility] Group Therapy   Group Name Education   Specific Interventions Coping Skills Training   Participation Level Sharing;Attentive   Participation Quality Cooperative   Insight/Motivation Improved   Affect/Mood Display Appropriate   Cognition Alert

## 2019-03-13 NOTE — ASSESSMENT & PLAN NOTE
"Was diagnosed with Schizophrenia about 10 years ago, and reports trial of Haldol, Invega Sustenna, Abilify IM, Risperdal, Zyprexa, Latuda, Geodon, and Seroquel but reports none of these meds were efficacious. States that he will takes meds for a few months but then "they feel like poison and the medicine is out to get me". Does reports "jumping and shaking" side effects with some of the medications but is unable to report which specific meds caused this.     On interview, patient denies psychotic symptoms since admission to the hospital.     - Continue Abilify 10 mg qhs, home rx; patient is not interested in MACK at this time.  - B12 low, given 1000mcg IM, supplement with PO Folbic.  "

## 2019-03-13 NOTE — PLAN OF CARE
03/12/19 2000   [facility] Group Therapy   Group Name Community Reintegration   Specific Interventions Coping Skills Training   Participation Level None   Participation Quality Refused

## 2019-03-13 NOTE — PLAN OF CARE
Problem: Adult Behavioral Health Plan of Care  Goal: Plan of Care Review  Outcome: Ongoing (interventions implemented as appropriate)  Visible on unit during evening hours.  Compliant with scheduled HS medication.  No SI/HI/AVH or physical complaints reported thus far this shift.  Retired to bed early.  Safety maintained.  Q 15 minute observation continues.

## 2019-03-13 NOTE — PLAN OF CARE
Problem: Adult Behavioral Health Plan of Care  Goal: Plan of Care Review  Outcome: Ongoing (interventions implemented as appropriate)  Patient calm and cooperative. Denies SI/HI/AVH. Med compliant. Attended all unit activities. Endorses mood improved and  slept a lot.
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
## 2019-03-13 NOTE — SUBJECTIVE & OBJECTIVE
"Interval History: See hospital course    Family History     Problem Relation (Age of Onset)    Arthritis Mother, Father    Depression Father    Heart disease Father    Hyperlipidemia Mother        Tobacco Use    Smoking status: Current Every Day Smoker     Packs/day: 1.00     Years: 15.00     Pack years: 15.00     Types: Cigarettes   Substance and Sexual Activity    Alcohol use: Not on file    Drug use: Yes     Types: Amphetamines, Marijuana    Sexual activity: Not on file     Psychotherapeutics (From admission, onward)    Start     Stop Route Frequency Ordered    03/10/19 2100  ARIPiprazole tablet 10 mg      -- Oral Nightly 03/10/19 1023    03/09/19 1411  OLANZapine injection 10 mg      08/05 0611 IM Once as needed 03/09/19 1313    03/09/19 1411  OLANZapine tablet 10 mg      -- Oral Every 8 hours PRN 03/09/19 1313           Review of Systems   Constitutional: Negative for appetite change and fever.   HENT: Negative for sore throat.    Respiratory: Negative for cough and shortness of breath.    Cardiovascular: Negative for chest pain and palpitations.   Gastrointestinal: Negative for abdominal pain, constipation and diarrhea.   Musculoskeletal: Positive for arthralgias and back pain.   Skin: Negative for rash.   Neurological: Negative for headaches.   Psychiatric/Behavioral: Negative for hallucinations, sleep disturbance and suicidal ideas.     Objective:     Vital Signs (Most Recent):  Temp: 98.1 °F (36.7 °C) (03/13/19 0730)  Pulse: 97 (03/13/19 0730)  Resp: 16 (03/13/19 0730)  BP: 126/83 (03/13/19 0730) Vital Signs (24h Range):  Temp:  [98.1 °F (36.7 °C)-98.2 °F (36.8 °C)] 98.1 °F (36.7 °C)  Pulse:  [93-97] 97  Resp:  [16] 16  BP: (126-143)/(83-93) 126/83     Height: 5' 10" (177.8 cm)  Weight: 62.3 kg (137 lb 5.6 oz)  Body mass index is 19.71 kg/m².    No intake or output data in the 24 hours ending 03/13/19 1025    Physical Exam   Psychiatric:   Mental Status Exam:  Arousal: alert  Sensorium/Orientation: " "grossly intact  Behavior/Cooperation: normal, cooperative   Psychomotor: unremarkable and within normal limits  Speech: conversational rate, tone, and volume  Language: answered questions appropriately  Mood: "down"   Affect: Appropriate and reactive to content  Thought Process: linear, logical  Thought Content:   Auditory hallucinations: NO  Visual hallucinations: NO  Paranoia: NO  Delusions:  NO  Suicidal ideation: NO  Homicidal ideation: NO  Attention/Concentration:  intact  able to focus  Memory: grossly intact     Insight: improving  Judgment: improving           Significant Labs:   Utox positive for THC only    Significant Imaging: None  "

## 2019-03-13 NOTE — NURSING
Appears to have slept approximately 7-8 hours thus far this shift.  See Observation Checklist.  Safety maintained throughout shift.

## 2019-03-13 NOTE — PROGRESS NOTES
Group Psychotherapy (PhD/LCSW)    Site: [facility]    Clinical status of patient: Inpatient    Date: 3/13/2019    Group Focus: Life Skills    Length of service: 06578 - 35-40 minutes    Number of patients in attendance: 6    Referred by: Acute Psychiatry Unit Treatment Team    Target symptoms: Psychosis    Patient's response to treatment: Active Listening and Self-disclosure    Progress toward goals: Progressing slowly    Interval History: Pt appeared alert and attentive in group. Pt participated briefly, appropriately when prompted  in a group discussion of self-soothing strategies. Pt noted that when has felt suicidal, he was not in touch with how it would affect his love ones.     Diagnosis: Schizophrenia, unspecified     Plan: Continue treatment on APU

## 2019-03-13 NOTE — PROGRESS NOTES
03/13/19 0900 03/13/19 1100 03/13/19 1400   [facility] Group Therapy   Group Name Community Reintegration (pet therapy) Therapeutic Recreation   Specific Interventions Current Events Sensory Stimulation Skilled Activity Crafts   Participation Level Active;Appropriate Active;Appropriate Active;Appropriate   Participation Quality Cooperative Cooperative;Social Cooperative;Social   Insight/Motivation Good Good Good   Affect/Mood Display Appropriate Appropriate Appropriate   Cognition Alert Alert Alert
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
## 2019-03-14 PROBLEM — F19.10 POLYSUBSTANCE ABUSE: Status: ACTIVE | Noted: 2019-03-14

## 2019-03-14 PROCEDURE — 12400001 HC PSYCH SEMI-PRIVATE ROOM

## 2019-03-14 PROCEDURE — 25000003 PHARM REV CODE 250: Performed by: PSYCHIATRY & NEUROLOGY

## 2019-03-14 PROCEDURE — 99232 PR SUBSEQUENT HOSPITAL CARE,LEVL II: ICD-10-PCS | Mod: GC,,, | Performed by: PSYCHIATRY & NEUROLOGY

## 2019-03-14 PROCEDURE — 90853 PR GROUP PSYCHOTHERAPY: ICD-10-PCS | Mod: ,,, | Performed by: PSYCHOLOGIST

## 2019-03-14 PROCEDURE — 99232 SBSQ HOSP IP/OBS MODERATE 35: CPT | Mod: GC,,, | Performed by: PSYCHIATRY & NEUROLOGY

## 2019-03-14 PROCEDURE — 90853 GROUP PSYCHOTHERAPY: CPT | Mod: ,,, | Performed by: PSYCHOLOGIST

## 2019-03-14 RX ADMIN — ACETAMINOPHEN 650 MG: 325 TABLET ORAL at 12:03

## 2019-03-14 RX ADMIN — THERA TABS 1 TABLET: TAB at 08:03

## 2019-03-14 RX ADMIN — ARIPIPRAZOLE 10 MG: 10 TABLET ORAL at 08:03

## 2019-03-14 RX ADMIN — FOLIC ACID-PYRIDOXINE-CYANOCOBALAMIN TAB 2.5-25-2 MG 1 TABLET: 2.5-25-2 TAB at 08:03

## 2019-03-14 NOTE — PROGRESS NOTES
03/13/19 2000   [facility] Group Therapy   Group Name Community Reintegration   Specific Interventions Current Events   Participation Level Active   Participation Quality Cooperative   Insight/Motivation Improved   Affect/Mood Display Appropriate   Cognition Alert

## 2019-03-14 NOTE — PLAN OF CARE
03/14/19 1500   [facility] Group Therapy   Group Name Education   Specific Interventions Coping Skills Training   Participation Level Active;Attentive;Sharing   Participation Quality Cooperative   Insight/Motivation Improved;Limited   Affect/Mood Display Appropriate   Cognition Alert

## 2019-03-14 NOTE — PROGRESS NOTES
03/14/19 0900 03/14/19 1000 03/14/19 1100   [facility] Group Therapy   Group Name Community Reintegration Education Education   Specific Interventions Current Events Assertiveness Training Guided Imagery/Relaxation   Participation Level Active;Appropriate Active;Appropriate Active;Appropriate   Participation Quality Cooperative;Social Cooperative;Social Cooperative;Social   Insight/Motivation Good Good Good   Affect/Mood Display Appropriate Appropriate Appropriate   Cognition Alert Alert Alert       03/14/19 1300   [facility] Group Therapy   Group Name Therapeutic Recreation   Specific Interventions Skilled Activity Crafts   Participation Level Active;Appropriate   Participation Quality Cooperative   Insight/Motivation Good   Affect/Mood Display Appropriate   Cognition Alert
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
## 2019-03-14 NOTE — PLAN OF CARE
Problem: Adult Behavioral Health Plan of Care  Goal: Plan of Care Review  Outcome: Ongoing (interventions implemented as appropriate)  Patient denies SI/Hi/AVH. Med compliant. Showered and dressed. Attended all unit activities. Less isolative. Complaint of neck pain. Tylenol given.
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
## 2019-03-14 NOTE — PROGRESS NOTES
"Group Psychotherapy (PhD/LCSW)    Site: [facility]    Clinical status of patient: Inpatient    Date: 3/14/2019    Group Focus: Life Skills    Length of service: 14408 - 35-40 minutes    Number of patients in attendance: 8    Referred by: Acute Psychiatry Unit Treatment Team    Target symptoms: Psychosis    Patient's response to treatment: Active Listening     Progress toward goals: Progressing slowly    Interval History: Pt appeared alert and attentive in group. Pt participated briefly, appropriately when prompted  in a group discussion of the issue of "choice" in regard to promoting happiness. Pt noted it depends a lot on how you wake up and who you choose to be around.     Diagnosis: Schizophrenia, unspecified     Plan: Continue treatment on APU        "

## 2019-03-14 NOTE — SUBJECTIVE & OBJECTIVE
"Interval History: See hospital course    Family History     Problem Relation (Age of Onset)    Arthritis Mother, Father    Depression Father    Heart disease Father    Hyperlipidemia Mother        Tobacco Use    Smoking status: Current Every Day Smoker     Packs/day: 1.00     Years: 15.00     Pack years: 15.00     Types: Cigarettes   Substance and Sexual Activity    Alcohol use: Not on file    Drug use: Yes     Types: Amphetamines, Marijuana    Sexual activity: Not on file     Psychotherapeutics (From admission, onward)    Start     Stop Route Frequency Ordered    03/10/19 2100  ARIPiprazole tablet 10 mg      -- Oral Nightly 03/10/19 1023    03/09/19 1411  OLANZapine injection 10 mg      08/05 0611 IM Once as needed 03/09/19 1313    03/09/19 1411  OLANZapine tablet 10 mg      -- Oral Every 8 hours PRN 03/09/19 1313           Review of Systems   Constitutional: Negative for appetite change and fever.   HENT: Negative for sore throat.    Respiratory: Negative for cough and shortness of breath.    Cardiovascular: Negative for chest pain and palpitations.   Gastrointestinal: Negative for abdominal pain, constipation and diarrhea.   Musculoskeletal: Positive for arthralgias and back pain.   Skin: Negative for rash.   Neurological: Negative for headaches.   Psychiatric/Behavioral: Negative for hallucinations, sleep disturbance and suicidal ideas.     Objective:     Vital Signs (Most Recent):  Temp: 98.1 °F (36.7 °C) (03/14/19 0726)  Pulse: 84 (03/14/19 0726)  Resp: 16 (03/14/19 0726)  BP: 117/83 (03/14/19 0726) Vital Signs (24h Range):  Temp:  [98.1 °F (36.7 °C)-98.8 °F (37.1 °C)] 98.1 °F (36.7 °C)  Pulse:  [84-89] 84  Resp:  [16-18] 16  BP: (117-133)/(83-85) 117/83     Height: 5' 10" (177.8 cm)  Weight: 62.3 kg (137 lb 5.6 oz)  Body mass index is 19.71 kg/m².    No intake or output data in the 24 hours ending 03/14/19 1115    Physical Exam   Psychiatric:   Mental Status Exam:  Arousal: alert  Sensorium/Orientation: " "grossly intact  Behavior/Cooperation: normal, cooperative   Psychomotor: unremarkable and within normal limits  Speech: conversational rate, tone, and volume  Language: answered questions appropriately  Mood: "alright"   Affect: constricted  Thought Process: linear, logical  Thought Content:   Auditory hallucinations: NO  Visual hallucinations: NO  Paranoia: NO  Delusions:  NO  Suicidal ideation: NO  Homicidal ideation: NO  Attention/Concentration:  intact  able to focus  Memory: grossly intact     Insight: improving, but limited  Judgment: limited          Significant Labs:   Utox positive for THC only    Significant Imaging: None  "
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
## 2019-03-15 VITALS
BODY MASS INDEX: 19.67 KG/M2 | DIASTOLIC BLOOD PRESSURE: 85 MMHG | WEIGHT: 137.38 LBS | HEIGHT: 70 IN | TEMPERATURE: 98 F | RESPIRATION RATE: 16 BRPM | SYSTOLIC BLOOD PRESSURE: 135 MMHG | HEART RATE: 89 BPM

## 2019-03-15 LAB — VIT B1 BLD-MCNC: 57 UG/L (ref 38–122)

## 2019-03-15 PROCEDURE — 90853 PR GROUP PSYCHOTHERAPY: ICD-10-PCS | Mod: ,,, | Performed by: PSYCHOLOGIST

## 2019-03-15 PROCEDURE — 25000003 PHARM REV CODE 250: Performed by: PSYCHIATRY & NEUROLOGY

## 2019-03-15 PROCEDURE — 99239 HOSP IP/OBS DSCHRG MGMT >30: CPT | Mod: GC,,, | Performed by: PSYCHIATRY & NEUROLOGY

## 2019-03-15 PROCEDURE — 99239 PR HOSPITAL DISCHARGE DAY,>30 MIN: ICD-10-PCS | Mod: GC,,, | Performed by: PSYCHIATRY & NEUROLOGY

## 2019-03-15 PROCEDURE — 90853 GROUP PSYCHOTHERAPY: CPT | Mod: ,,, | Performed by: PSYCHOLOGIST

## 2019-03-15 RX ORDER — ARIPIPRAZOLE 10 MG/1
10 TABLET ORAL NIGHTLY
Qty: 30 TABLET | Refills: 0 | Status: SHIPPED | OUTPATIENT
Start: 2019-03-15 | End: 2019-06-11

## 2019-03-15 RX ORDER — MAGNESIUM 200 MG
1000 TABLET ORAL DAILY
Qty: 30 TABLET | Refills: 0 | Status: SHIPPED | OUTPATIENT
Start: 2019-03-15 | End: 2019-06-11

## 2019-03-15 RX ADMIN — THERA TABS 1 TABLET: TAB at 08:03

## 2019-03-15 RX ADMIN — FOLIC ACID-PYRIDOXINE-CYANOCOBALAMIN TAB 2.5-25-2 MG 1 TABLET: 2.5-25-2 TAB at 08:03

## 2019-03-15 NOTE — SUBJECTIVE & OBJECTIVE
"Interval History: See hospital course    Family History     Problem Relation (Age of Onset)    Arthritis Mother, Father    Depression Father    Heart disease Father    Hyperlipidemia Mother        Tobacco Use    Smoking status: Current Every Day Smoker     Packs/day: 1.00     Years: 15.00     Pack years: 15.00     Types: Cigarettes   Substance and Sexual Activity    Alcohol use: Not on file    Drug use: Yes     Types: Amphetamines, Marijuana    Sexual activity: Not on file     Psychotherapeutics (From admission, onward)    Start     Stop Route Frequency Ordered    03/10/19 2100  ARIPiprazole tablet 10 mg      -- Oral Nightly 03/10/19 1023    03/09/19 1411  OLANZapine injection 10 mg      08/05 0611 IM Once as needed 03/09/19 1313    03/09/19 1411  OLANZapine tablet 10 mg      -- Oral Every 8 hours PRN 03/09/19 1313           Review of Systems   Constitutional: Negative for appetite change and fever.   HENT: Negative for sore throat.    Respiratory: Negative for cough and shortness of breath.    Cardiovascular: Negative for chest pain and palpitations.   Gastrointestinal: Negative for abdominal pain, constipation and diarrhea.   Musculoskeletal: Positive for arthralgias and back pain.   Skin: Negative for rash.   Neurological: Negative for headaches.   Psychiatric/Behavioral: Negative for hallucinations, sleep disturbance and suicidal ideas.     Objective:     Vital Signs (Most Recent):  Temp: 97.9 °F (36.6 °C) (03/15/19 0724)  Pulse: 89 (03/15/19 0724)  Resp: 16 (03/15/19 0724)  BP: 135/85 (03/15/19 0724) Vital Signs (24h Range):  Temp:  [97.9 °F (36.6 °C)] 97.9 °F (36.6 °C)  Pulse:  [86-89] 89  Resp:  [16-18] 16  BP: (135-158)/(85-95) 135/85     Height: 5' 10" (177.8 cm)  Weight: 62.3 kg (137 lb 5.6 oz)  Body mass index is 19.71 kg/m².    No intake or output data in the 24 hours ending 03/15/19 1015    Physical Exam   Psychiatric:   Mental Status Exam:  Arousal: alert  Sensorium/Orientation: grossly " "intact  Behavior/Cooperation: normal, cooperative   Psychomotor: unremarkable and within normal limits  Speech: conversational rate, tone, and volume  Language: answered questions appropriately  Mood: "alright"   Affect: constricted  Thought Process: linear, logical  Thought Content:   Auditory hallucinations: NO  Visual hallucinations: NO  Paranoia: NO  Delusions:  NO  Suicidal ideation: NO  Homicidal ideation: NO  Attention/Concentration:  intact  able to focus  Memory: grossly intact     Insight: improving, but limited  Judgment: limited          Significant Labs:   Utox positive for THC only    Significant Imaging: None  "

## 2019-03-15 NOTE — PROGRESS NOTES
03/14/19 2000   [facility] Group Therapy   Group Name Community Reintegration   Specific Interventions Current Events   Participation Level Appropriate   Participation Quality Cooperative   Insight/Motivation Improved   Affect/Mood Display Appropriate   Cognition Alert

## 2019-03-15 NOTE — PROGRESS NOTES
Group Psychotherapy (PhD/LCSW)    Site: [facility]    Clinical status of patient: Inpatient    Date: 3/15/2019    Group Focus: Life Skills    Length of service: 58018 - 35-40 minutes    Number of patients in attendance: 4    Referred by: Acute Psychiatry Unit Treatment Team    Target symptoms: Psychosis    Patient's response to treatment: Active Listening     Progress toward goals: Progressing slowly    Interval History: Pt appeared alert and attentive in group. Pt participated appropriately when prompted  in a group discussion of basic communication skills (I-messages vs You-messages).    Diagnosis: Schizophrenia, unspecified     Plan: see Discharge Summary dated 3/15/19
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
## 2019-08-23 PROBLEM — T44.7X2A SUICIDE ATTEMPT BY BETA BLOCKER OVERDOSE: Status: ACTIVE | Noted: 2019-08-23

## 2019-08-23 PROBLEM — R00.1 BRADYCARDIA: Status: ACTIVE | Noted: 2019-08-23

## 2019-08-23 PROBLEM — I95.9 HYPOTENSION: Status: ACTIVE | Noted: 2019-08-23

## 2019-08-25 PROBLEM — R00.1 BRADYCARDIA: Status: RESOLVED | Noted: 2019-08-23 | Resolved: 2019-08-25

## 2019-12-09 ENCOUNTER — LAB VISIT (OUTPATIENT)
Dept: LAB | Facility: HOSPITAL | Age: 54
End: 2019-12-09
Attending: PSYCHIATRY & NEUROLOGY
Payer: MEDICARE

## 2019-12-09 DIAGNOSIS — Z79.899 ENCOUNTER FOR LONG-TERM (CURRENT) USE OF OTHER MEDICATIONS: Primary | ICD-10-CM

## 2019-12-09 LAB
ALBUMIN SERPL BCP-MCNC: 4.1 G/DL (ref 3.5–5.2)
ALP SERPL-CCNC: 91 U/L (ref 55–135)
ALT SERPL W/O P-5'-P-CCNC: 25 U/L (ref 10–44)
ANION GAP SERPL CALC-SCNC: 6 MMOL/L (ref 8–16)
AST SERPL-CCNC: 20 U/L (ref 10–40)
BASOPHILS # BLD AUTO: 0.03 K/UL (ref 0–0.2)
BASOPHILS NFR BLD: 0.4 % (ref 0–1.9)
BILIRUB SERPL-MCNC: 0.6 MG/DL (ref 0.1–1)
BUN SERPL-MCNC: 15 MG/DL (ref 6–20)
CALCIUM SERPL-MCNC: 9.7 MG/DL (ref 8.7–10.5)
CHLORIDE SERPL-SCNC: 106 MMOL/L (ref 95–110)
CHOLEST SERPL-MCNC: 206 MG/DL (ref 120–199)
CHOLEST/HDLC SERPL: 5.2 {RATIO} (ref 2–5)
CO2 SERPL-SCNC: 29 MMOL/L (ref 23–29)
CREAT SERPL-MCNC: 1 MG/DL (ref 0.5–1.4)
DIFFERENTIAL METHOD: NORMAL
EOSINOPHIL # BLD AUTO: 0.3 K/UL (ref 0–0.5)
EOSINOPHIL NFR BLD: 4 % (ref 0–8)
ERYTHROCYTE [DISTWIDTH] IN BLOOD BY AUTOMATED COUNT: 12.9 % (ref 11.5–14.5)
EST. GFR  (AFRICAN AMERICAN): >60 ML/MIN/1.73 M^2
EST. GFR  (NON AFRICAN AMERICAN): >60 ML/MIN/1.73 M^2
ESTIMATED AVG GLUCOSE: 111 MG/DL (ref 68–131)
GLUCOSE SERPL-MCNC: 92 MG/DL (ref 70–110)
HBA1C MFR BLD HPLC: 5.5 % (ref 4–5.6)
HCT VFR BLD AUTO: 47.2 % (ref 40–54)
HDLC SERPL-MCNC: 40 MG/DL (ref 40–75)
HDLC SERPL: 19.4 % (ref 20–50)
HGB BLD-MCNC: 15.3 G/DL (ref 14–18)
IMM GRANULOCYTES # BLD AUTO: 0.03 K/UL (ref 0–0.04)
IMM GRANULOCYTES NFR BLD AUTO: 0.4 % (ref 0–0.5)
LDLC SERPL CALC-MCNC: 131.2 MG/DL (ref 63–159)
LYMPHOCYTES # BLD AUTO: 1.8 K/UL (ref 1–4.8)
LYMPHOCYTES NFR BLD: 23.1 % (ref 18–48)
MCH RBC QN AUTO: 28.4 PG (ref 27–31)
MCHC RBC AUTO-ENTMCNC: 32.4 G/DL (ref 32–36)
MCV RBC AUTO: 88 FL (ref 82–98)
MONOCYTES # BLD AUTO: 0.6 K/UL (ref 0.3–1)
MONOCYTES NFR BLD: 7.6 % (ref 4–15)
NEUTROPHILS # BLD AUTO: 5.1 K/UL (ref 1.8–7.7)
NEUTROPHILS NFR BLD: 64.5 % (ref 38–73)
NONHDLC SERPL-MCNC: 166 MG/DL
NRBC BLD-RTO: 0 /100 WBC
PLATELET # BLD AUTO: 247 K/UL (ref 150–350)
PMV BLD AUTO: 10.6 FL (ref 9.2–12.9)
POTASSIUM SERPL-SCNC: 4.3 MMOL/L (ref 3.5–5.1)
PROT SERPL-MCNC: 7.7 G/DL (ref 6–8.4)
RBC # BLD AUTO: 5.39 M/UL (ref 4.6–6.2)
SODIUM SERPL-SCNC: 141 MMOL/L (ref 136–145)
TRIGL SERPL-MCNC: 174 MG/DL (ref 30–150)
WBC # BLD AUTO: 7.91 K/UL (ref 3.9–12.7)

## 2019-12-09 PROCEDURE — 83036 HEMOGLOBIN GLYCOSYLATED A1C: CPT

## 2019-12-09 PROCEDURE — 80053 COMPREHEN METABOLIC PANEL: CPT

## 2019-12-09 PROCEDURE — 80061 LIPID PANEL: CPT

## 2019-12-09 PROCEDURE — 85025 COMPLETE CBC W/AUTO DIFF WBC: CPT

## 2019-12-09 PROCEDURE — 36415 COLL VENOUS BLD VENIPUNCTURE: CPT

## 2020-11-10 ENCOUNTER — HOSPITAL ENCOUNTER (OUTPATIENT)
Dept: RADIOLOGY | Facility: HOSPITAL | Age: 55
Discharge: HOME | End: 2020-11-10
Attending: NURSE PRACTITIONER
Payer: MEDICARE

## 2020-11-10 DIAGNOSIS — M54.50 LOW BACK PAIN: ICD-10-CM

## 2020-11-10 PROCEDURE — 72100 X-RAY EXAM L-S SPINE 2/3 VWS: CPT | Mod: TC

## 2020-11-10 PROCEDURE — 72100 XR LUMBAR SPINE 2 OR 3 VIEWS: ICD-10-PCS | Mod: 26,,, | Performed by: RADIOLOGY

## 2020-11-10 PROCEDURE — 72100 X-RAY EXAM L-S SPINE 2/3 VWS: CPT | Mod: 26,,, | Performed by: RADIOLOGY

## 2021-02-02 ENCOUNTER — LAB VISIT (OUTPATIENT)
Dept: LAB | Facility: HOSPITAL | Age: 56
End: 2021-02-02
Attending: PSYCHIATRY & NEUROLOGY
Payer: MEDICARE

## 2021-02-02 DIAGNOSIS — Z79.899 ENCOUNTER FOR LONG-TERM (CURRENT) USE OF OTHER MEDICATIONS: Primary | ICD-10-CM

## 2021-02-02 LAB
ALBUMIN SERPL BCP-MCNC: 4.3 G/DL (ref 3.5–5.2)
ALP SERPL-CCNC: 91 U/L (ref 55–135)
ALT SERPL W/O P-5'-P-CCNC: 43 U/L (ref 10–44)
ANION GAP SERPL CALC-SCNC: 8 MMOL/L (ref 8–16)
AST SERPL-CCNC: 24 U/L (ref 10–40)
BASOPHILS # BLD AUTO: 0.03 K/UL (ref 0–0.2)
BASOPHILS NFR BLD: 0.4 % (ref 0–1.9)
BILIRUB SERPL-MCNC: 0.6 MG/DL (ref 0.1–1)
BUN SERPL-MCNC: 10 MG/DL (ref 6–20)
CALCIUM SERPL-MCNC: 9.8 MG/DL (ref 8.7–10.5)
CHLORIDE SERPL-SCNC: 102 MMOL/L (ref 95–110)
CHOLEST SERPL-MCNC: 149 MG/DL (ref 120–199)
CHOLEST/HDLC SERPL: 3.6 {RATIO} (ref 2–5)
CO2 SERPL-SCNC: 28 MMOL/L (ref 23–29)
CREAT SERPL-MCNC: 1.1 MG/DL (ref 0.5–1.4)
DIFFERENTIAL METHOD: NORMAL
EOSINOPHIL # BLD AUTO: 0.4 K/UL (ref 0–0.5)
EOSINOPHIL NFR BLD: 5.3 % (ref 0–8)
ERYTHROCYTE [DISTWIDTH] IN BLOOD BY AUTOMATED COUNT: 12.4 % (ref 11.5–14.5)
EST. GFR  (AFRICAN AMERICAN): >60 ML/MIN/1.73 M^2
EST. GFR  (NON AFRICAN AMERICAN): >60 ML/MIN/1.73 M^2
ESTIMATED AVG GLUCOSE: 108 MG/DL (ref 68–131)
GLUCOSE SERPL-MCNC: 71 MG/DL (ref 70–110)
HBA1C MFR BLD: 5.4 % (ref 4–5.6)
HCT VFR BLD AUTO: 48 % (ref 40–54)
HDLC SERPL-MCNC: 41 MG/DL (ref 40–75)
HDLC SERPL: 27.5 % (ref 20–50)
HGB BLD-MCNC: 15.7 G/DL (ref 14–18)
IMM GRANULOCYTES # BLD AUTO: 0.02 K/UL (ref 0–0.04)
IMM GRANULOCYTES NFR BLD AUTO: 0.3 % (ref 0–0.5)
LDLC SERPL CALC-MCNC: 78.4 MG/DL (ref 63–159)
LYMPHOCYTES # BLD AUTO: 2.1 K/UL (ref 1–4.8)
LYMPHOCYTES NFR BLD: 29.3 % (ref 18–48)
MCH RBC QN AUTO: 28.6 PG (ref 27–31)
MCHC RBC AUTO-ENTMCNC: 32.7 G/DL (ref 32–36)
MCV RBC AUTO: 88 FL (ref 82–98)
MONOCYTES # BLD AUTO: 0.7 K/UL (ref 0.3–1)
MONOCYTES NFR BLD: 9.5 % (ref 4–15)
NEUTROPHILS # BLD AUTO: 3.9 K/UL (ref 1.8–7.7)
NEUTROPHILS NFR BLD: 55.2 % (ref 38–73)
NONHDLC SERPL-MCNC: 108 MG/DL
NRBC BLD-RTO: 0 /100 WBC
PLATELET # BLD AUTO: 218 K/UL (ref 150–350)
PMV BLD AUTO: 10.5 FL (ref 9.2–12.9)
POTASSIUM SERPL-SCNC: 4.7 MMOL/L (ref 3.5–5.1)
PROT SERPL-MCNC: 8 G/DL (ref 6–8.4)
RBC # BLD AUTO: 5.48 M/UL (ref 4.6–6.2)
SODIUM SERPL-SCNC: 138 MMOL/L (ref 136–145)
TRIGL SERPL-MCNC: 148 MG/DL (ref 30–150)
WBC # BLD AUTO: 7.03 K/UL (ref 3.9–12.7)

## 2021-02-02 PROCEDURE — 80061 LIPID PANEL: CPT

## 2021-02-02 PROCEDURE — 80053 COMPREHEN METABOLIC PANEL: CPT

## 2021-02-02 PROCEDURE — 83036 HEMOGLOBIN GLYCOSYLATED A1C: CPT

## 2021-02-02 PROCEDURE — 36415 COLL VENOUS BLD VENIPUNCTURE: CPT

## 2021-02-02 PROCEDURE — 85025 COMPLETE CBC W/AUTO DIFF WBC: CPT

## 2021-12-03 ENCOUNTER — HOSPITAL ENCOUNTER (OUTPATIENT)
Dept: RADIOLOGY | Facility: HOSPITAL | Age: 56
Discharge: HOME | End: 2021-12-03
Attending: NURSE PRACTITIONER
Payer: MEDICARE

## 2021-12-03 DIAGNOSIS — M54.50 LOW BACK PAIN, UNSPECIFIED: ICD-10-CM

## 2021-12-03 DIAGNOSIS — M79.644 PAIN IN RIGHT FINGER(S): ICD-10-CM

## 2021-12-03 DIAGNOSIS — M25.552 PAIN IN LEFT HIP: ICD-10-CM

## 2021-12-03 PROCEDURE — 73502 X-RAY EXAM HIP UNI 2-3 VIEWS: CPT | Mod: TC,LT

## 2021-12-03 PROCEDURE — 72100 X-RAY EXAM L-S SPINE 2/3 VWS: CPT | Mod: 26,,, | Performed by: RADIOLOGY

## 2021-12-03 PROCEDURE — 73140 X-RAY EXAM OF FINGER(S): CPT | Mod: 26,RT,, | Performed by: RADIOLOGY

## 2021-12-03 PROCEDURE — 73502 X-RAY EXAM HIP UNI 2-3 VIEWS: CPT | Mod: 26,LT,, | Performed by: RADIOLOGY

## 2021-12-03 PROCEDURE — 72100 XR LUMBAR SPINE AP AND LATERAL: ICD-10-PCS | Mod: 26,,, | Performed by: RADIOLOGY

## 2021-12-03 PROCEDURE — 73140 X-RAY EXAM OF FINGER(S): CPT | Mod: TC,RT

## 2021-12-03 PROCEDURE — 73502 XR HIP WITH PELVIS WHEN PERFORMED, 2 OR 3 VIEWS LEFT: ICD-10-PCS | Mod: 26,LT,, | Performed by: RADIOLOGY

## 2021-12-03 PROCEDURE — 72100 X-RAY EXAM L-S SPINE 2/3 VWS: CPT | Mod: TC

## 2021-12-03 PROCEDURE — 73140 XR FINGER 2 OR MORE VIEWS RIGHT: ICD-10-PCS | Mod: 26,RT,, | Performed by: RADIOLOGY

## 2022-03-15 ENCOUNTER — LAB VISIT (OUTPATIENT)
Dept: LAB | Facility: HOSPITAL | Age: 57
End: 2022-03-15
Attending: PSYCHIATRY & NEUROLOGY
Payer: MEDICARE

## 2022-03-15 DIAGNOSIS — Z79.899 ENCOUNTER FOR LONG-TERM (CURRENT) USE OF OTHER MEDICATIONS: Primary | ICD-10-CM

## 2022-03-15 LAB
ALBUMIN SERPL BCP-MCNC: 4.6 G/DL (ref 3.5–5.2)
ALP SERPL-CCNC: 77 U/L (ref 55–135)
ALT SERPL W/O P-5'-P-CCNC: 37 U/L (ref 10–44)
ANION GAP SERPL CALC-SCNC: 12 MMOL/L (ref 8–16)
AST SERPL-CCNC: 23 U/L (ref 10–40)
BASOPHILS # BLD AUTO: 0.03 K/UL (ref 0–0.2)
BASOPHILS NFR BLD: 0.5 % (ref 0–1.9)
BILIRUB SERPL-MCNC: 0.4 MG/DL (ref 0.1–1)
BUN SERPL-MCNC: 17 MG/DL (ref 6–20)
CALCIUM SERPL-MCNC: 10.2 MG/DL (ref 8.7–10.5)
CHLORIDE SERPL-SCNC: 100 MMOL/L (ref 95–110)
CHOLEST SERPL-MCNC: 255 MG/DL (ref 120–199)
CHOLEST/HDLC SERPL: 6.7 {RATIO} (ref 2–5)
CO2 SERPL-SCNC: 24 MMOL/L (ref 23–29)
CREAT SERPL-MCNC: 1.2 MG/DL (ref 0.5–1.4)
DIFFERENTIAL METHOD: NORMAL
EOSINOPHIL # BLD AUTO: 0.1 K/UL (ref 0–0.5)
EOSINOPHIL NFR BLD: 2.2 % (ref 0–8)
ERYTHROCYTE [DISTWIDTH] IN BLOOD BY AUTOMATED COUNT: 12.2 % (ref 11.5–14.5)
EST. GFR  (AFRICAN AMERICAN): >60 ML/MIN/1.73 M^2
EST. GFR  (NON AFRICAN AMERICAN): >60 ML/MIN/1.73 M^2
ESTIMATED AVG GLUCOSE: 120 MG/DL (ref 68–131)
GLUCOSE SERPL-MCNC: 98 MG/DL (ref 70–110)
HBA1C MFR BLD: 5.8 % (ref 4–5.6)
HCT VFR BLD AUTO: 44.4 % (ref 40–54)
HDLC SERPL-MCNC: 38 MG/DL (ref 40–75)
HDLC SERPL: 14.9 % (ref 20–50)
HGB BLD-MCNC: 14.6 G/DL (ref 14–18)
IMM GRANULOCYTES # BLD AUTO: 0.03 K/UL (ref 0–0.04)
IMM GRANULOCYTES NFR BLD AUTO: 0.5 % (ref 0–0.5)
LDLC SERPL CALC-MCNC: 148.4 MG/DL (ref 63–159)
LYMPHOCYTES # BLD AUTO: 1.8 K/UL (ref 1–4.8)
LYMPHOCYTES NFR BLD: 29.2 % (ref 18–48)
MCH RBC QN AUTO: 28.5 PG (ref 27–31)
MCHC RBC AUTO-ENTMCNC: 32.9 G/DL (ref 32–36)
MCV RBC AUTO: 87 FL (ref 82–98)
MONOCYTES # BLD AUTO: 0.4 K/UL (ref 0.3–1)
MONOCYTES NFR BLD: 7.3 % (ref 4–15)
NEUTROPHILS # BLD AUTO: 3.6 K/UL (ref 1.8–7.7)
NEUTROPHILS NFR BLD: 60.3 % (ref 38–73)
NONHDLC SERPL-MCNC: 217 MG/DL
NRBC BLD-RTO: 0 /100 WBC
PLATELET # BLD AUTO: 260 K/UL (ref 150–450)
PMV BLD AUTO: 10.6 FL (ref 9.2–12.9)
POTASSIUM SERPL-SCNC: 4.2 MMOL/L (ref 3.5–5.1)
PROT SERPL-MCNC: 8.3 G/DL (ref 6–8.4)
RBC # BLD AUTO: 5.12 M/UL (ref 4.6–6.2)
SODIUM SERPL-SCNC: 136 MMOL/L (ref 136–145)
TRIGL SERPL-MCNC: 343 MG/DL (ref 30–150)
WBC # BLD AUTO: 6 K/UL (ref 3.9–12.7)

## 2022-03-15 PROCEDURE — 80061 LIPID PANEL: CPT | Performed by: PSYCHIATRY & NEUROLOGY

## 2022-03-15 PROCEDURE — 80053 COMPREHEN METABOLIC PANEL: CPT | Performed by: PSYCHIATRY & NEUROLOGY

## 2022-03-15 PROCEDURE — 85025 COMPLETE CBC W/AUTO DIFF WBC: CPT | Performed by: PSYCHIATRY & NEUROLOGY

## 2022-03-15 PROCEDURE — 83036 HEMOGLOBIN GLYCOSYLATED A1C: CPT | Performed by: PSYCHIATRY & NEUROLOGY

## 2022-03-15 PROCEDURE — 36415 COLL VENOUS BLD VENIPUNCTURE: CPT | Performed by: PSYCHIATRY & NEUROLOGY

## 2022-03-28 ENCOUNTER — HOSPITAL ENCOUNTER (OUTPATIENT)
Dept: RADIOLOGY | Facility: HOSPITAL | Age: 57
Discharge: HOME | End: 2022-03-28
Attending: FAMILY MEDICINE
Payer: MEDICARE

## 2022-03-28 DIAGNOSIS — I10 ESSENTIAL (PRIMARY) HYPERTENSION: ICD-10-CM

## 2022-03-28 PROCEDURE — 71046 X-RAY EXAM CHEST 2 VIEWS: CPT | Mod: TC

## 2022-03-28 PROCEDURE — 71046 XR CHEST PA AND LATERAL: ICD-10-PCS | Mod: 26,,, | Performed by: RADIOLOGY

## 2022-03-28 PROCEDURE — 71046 X-RAY EXAM CHEST 2 VIEWS: CPT | Mod: 26,,, | Performed by: RADIOLOGY

## 2022-09-09 ENCOUNTER — LAB VISIT (OUTPATIENT)
Dept: LAB | Facility: HOSPITAL | Age: 57
End: 2022-09-09
Attending: PSYCHIATRY & NEUROLOGY
Payer: MEDICARE

## 2022-09-09 DIAGNOSIS — Z79.899 DRUG THERAPY: Primary | ICD-10-CM

## 2022-09-09 LAB
ALBUMIN SERPL BCP-MCNC: 3.9 G/DL (ref 3.5–5.2)
ALP SERPL-CCNC: 77 U/L (ref 55–135)
ALT SERPL W/O P-5'-P-CCNC: 54 U/L (ref 10–44)
ANION GAP SERPL CALC-SCNC: 11 MMOL/L (ref 8–16)
AST SERPL-CCNC: 40 U/L (ref 10–40)
BASOPHILS # BLD AUTO: 0.03 K/UL (ref 0–0.2)
BASOPHILS NFR BLD: 0.5 % (ref 0–1.9)
BILIRUB SERPL-MCNC: 0.9 MG/DL (ref 0.1–1)
BUN SERPL-MCNC: 11 MG/DL (ref 6–20)
CALCIUM SERPL-MCNC: 9.5 MG/DL (ref 8.7–10.5)
CHLORIDE SERPL-SCNC: 103 MMOL/L (ref 95–110)
CHOLEST SERPL-MCNC: 126 MG/DL (ref 120–199)
CHOLEST/HDLC SERPL: 4.5 {RATIO} (ref 2–5)
CO2 SERPL-SCNC: 24 MMOL/L (ref 23–29)
CREAT SERPL-MCNC: 1.1 MG/DL (ref 0.5–1.4)
DIFFERENTIAL METHOD: NORMAL
EOSINOPHIL # BLD AUTO: 0.4 K/UL (ref 0–0.5)
EOSINOPHIL NFR BLD: 6.1 % (ref 0–8)
ERYTHROCYTE [DISTWIDTH] IN BLOOD BY AUTOMATED COUNT: 12 % (ref 11.5–14.5)
EST. GFR  (NO RACE VARIABLE): >60 ML/MIN/1.73 M^2
ESTIMATED AVG GLUCOSE: 114 MG/DL (ref 68–131)
GLUCOSE SERPL-MCNC: 109 MG/DL (ref 70–110)
HBA1C MFR BLD: 5.6 % (ref 4–5.6)
HCT VFR BLD AUTO: 44.1 % (ref 40–54)
HDLC SERPL-MCNC: 28 MG/DL (ref 40–75)
HDLC SERPL: 22.2 % (ref 20–50)
HGB BLD-MCNC: 15.1 G/DL (ref 14–18)
IMM GRANULOCYTES # BLD AUTO: 0.02 K/UL (ref 0–0.04)
IMM GRANULOCYTES NFR BLD AUTO: 0.3 % (ref 0–0.5)
LDLC SERPL CALC-MCNC: 70.2 MG/DL (ref 63–159)
LYMPHOCYTES # BLD AUTO: 1.8 K/UL (ref 1–4.8)
LYMPHOCYTES NFR BLD: 26.9 % (ref 18–48)
MCH RBC QN AUTO: 28.9 PG (ref 27–31)
MCHC RBC AUTO-ENTMCNC: 34.2 G/DL (ref 32–36)
MCV RBC AUTO: 85 FL (ref 82–98)
MONOCYTES # BLD AUTO: 0.6 K/UL (ref 0.3–1)
MONOCYTES NFR BLD: 9.3 % (ref 4–15)
NEUTROPHILS # BLD AUTO: 3.7 K/UL (ref 1.8–7.7)
NEUTROPHILS NFR BLD: 56.9 % (ref 38–73)
NONHDLC SERPL-MCNC: 98 MG/DL
NRBC BLD-RTO: 0 /100 WBC
PLATELET # BLD AUTO: 225 K/UL (ref 150–450)
PMV BLD AUTO: 10 FL (ref 9.2–12.9)
POTASSIUM SERPL-SCNC: 3.4 MMOL/L (ref 3.5–5.1)
PROT SERPL-MCNC: 7.4 G/DL (ref 6–8.4)
RBC # BLD AUTO: 5.22 M/UL (ref 4.6–6.2)
SODIUM SERPL-SCNC: 138 MMOL/L (ref 136–145)
TRIGL SERPL-MCNC: 139 MG/DL (ref 30–150)
WBC # BLD AUTO: 6.55 K/UL (ref 3.9–12.7)

## 2022-09-09 PROCEDURE — 80053 COMPREHEN METABOLIC PANEL: CPT | Performed by: PSYCHIATRY & NEUROLOGY

## 2022-09-09 PROCEDURE — 83036 HEMOGLOBIN GLYCOSYLATED A1C: CPT | Performed by: PSYCHIATRY & NEUROLOGY

## 2022-09-09 PROCEDURE — 80061 LIPID PANEL: CPT | Performed by: PSYCHIATRY & NEUROLOGY

## 2022-09-09 PROCEDURE — 36415 COLL VENOUS BLD VENIPUNCTURE: CPT | Performed by: PSYCHIATRY & NEUROLOGY

## 2022-09-09 PROCEDURE — 85025 COMPLETE CBC W/AUTO DIFF WBC: CPT | Performed by: PSYCHIATRY & NEUROLOGY
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
## 2023-11-22 ENCOUNTER — HOSPITAL ENCOUNTER (OUTPATIENT)
Dept: RADIOLOGY | Facility: HOSPITAL | Age: 58
Discharge: HOME | End: 2023-11-22
Attending: PAIN MEDICINE
Payer: MEDICARE

## 2023-11-22 DIAGNOSIS — R91.8 ABNORMAL RADIOLOGIC FINDING OF LUNG FIELD: ICD-10-CM

## 2023-11-22 PROCEDURE — 71250 CT THORAX DX C-: CPT | Mod: TC

## 2023-11-22 PROCEDURE — 71250 CT THORAX DX C-: CPT | Mod: 26,,, | Performed by: RADIOLOGY

## 2023-11-22 PROCEDURE — 71250 CT CHEST WITHOUT CONTRAST: ICD-10-PCS | Mod: 26,,, | Performed by: RADIOLOGY

## 2024-08-19 ENCOUNTER — LAB VISIT (OUTPATIENT)
Dept: LAB | Facility: HOSPITAL | Age: 59
End: 2024-08-19
Attending: PSYCHIATRY & NEUROLOGY
Payer: MEDICARE

## 2024-08-19 DIAGNOSIS — Z79.899 DRUG THERAPY: Primary | ICD-10-CM

## 2024-08-19 LAB
ALBUMIN SERPL BCP-MCNC: 4.2 G/DL (ref 3.5–5.2)
ALP SERPL-CCNC: 87 U/L (ref 55–135)
ALT SERPL W/O P-5'-P-CCNC: 42 U/L (ref 10–44)
ANION GAP SERPL CALC-SCNC: 10 MMOL/L (ref 8–16)
AST SERPL-CCNC: 22 U/L (ref 10–40)
BASOPHILS # BLD AUTO: 0.03 K/UL (ref 0–0.2)
BASOPHILS NFR BLD: 0.5 % (ref 0–1.9)
BILIRUB SERPL-MCNC: 0.4 MG/DL (ref 0.1–1)
BUN SERPL-MCNC: 11 MG/DL (ref 6–20)
CALCIUM SERPL-MCNC: 9.8 MG/DL (ref 8.7–10.5)
CHLORIDE SERPL-SCNC: 105 MMOL/L (ref 95–110)
CHOLEST SERPL-MCNC: 176 MG/DL (ref 120–199)
CHOLEST/HDLC SERPL: 4.6 {RATIO} (ref 2–5)
CO2 SERPL-SCNC: 25 MMOL/L (ref 23–29)
CREAT SERPL-MCNC: 1.3 MG/DL (ref 0.5–1.4)
DIFFERENTIAL METHOD BLD: NORMAL
EOSINOPHIL # BLD AUTO: 0.2 K/UL (ref 0–0.5)
EOSINOPHIL NFR BLD: 2.8 % (ref 0–8)
ERYTHROCYTE [DISTWIDTH] IN BLOOD BY AUTOMATED COUNT: 12.7 % (ref 11.5–14.5)
EST. GFR  (NO RACE VARIABLE): >60 ML/MIN/1.73 M^2
ESTIMATED AVG GLUCOSE: 114 MG/DL (ref 68–131)
GLUCOSE SERPL-MCNC: 104 MG/DL (ref 70–110)
HBA1C MFR BLD: 5.6 % (ref 4–5.6)
HCT VFR BLD AUTO: 44.9 % (ref 40–54)
HDLC SERPL-MCNC: 38 MG/DL (ref 40–75)
HDLC SERPL: 21.6 % (ref 20–50)
HGB BLD-MCNC: 14.9 G/DL (ref 14–18)
IMM GRANULOCYTES # BLD AUTO: 0.03 K/UL (ref 0–0.04)
IMM GRANULOCYTES NFR BLD AUTO: 0.5 % (ref 0–0.5)
LDLC SERPL CALC-MCNC: 95.2 MG/DL (ref 63–159)
LYMPHOCYTES # BLD AUTO: 1.7 K/UL (ref 1–4.8)
LYMPHOCYTES NFR BLD: 29.4 % (ref 18–48)
MCH RBC QN AUTO: 28.9 PG (ref 27–31)
MCHC RBC AUTO-ENTMCNC: 33.2 G/DL (ref 32–36)
MCV RBC AUTO: 87 FL (ref 82–98)
MONOCYTES # BLD AUTO: 0.4 K/UL (ref 0.3–1)
MONOCYTES NFR BLD: 7.3 % (ref 4–15)
NEUTROPHILS # BLD AUTO: 3.4 K/UL (ref 1.8–7.7)
NEUTROPHILS NFR BLD: 59.5 % (ref 38–73)
NONHDLC SERPL-MCNC: 138 MG/DL
NRBC BLD-RTO: 0 /100 WBC
PLATELET # BLD AUTO: 218 K/UL (ref 150–450)
PMV BLD AUTO: 9.8 FL (ref 9.2–12.9)
POTASSIUM SERPL-SCNC: 5.4 MMOL/L (ref 3.5–5.1)
PROT SERPL-MCNC: 7.8 G/DL (ref 6–8.4)
RBC # BLD AUTO: 5.15 M/UL (ref 4.6–6.2)
SODIUM SERPL-SCNC: 140 MMOL/L (ref 136–145)
TRIGL SERPL-MCNC: 214 MG/DL (ref 30–150)
WBC # BLD AUTO: 5.74 K/UL (ref 3.9–12.7)

## 2024-08-19 PROCEDURE — 80061 LIPID PANEL: CPT | Performed by: PSYCHIATRY & NEUROLOGY

## 2024-08-19 PROCEDURE — 83036 HEMOGLOBIN GLYCOSYLATED A1C: CPT | Performed by: PSYCHIATRY & NEUROLOGY

## 2024-08-19 PROCEDURE — 85025 COMPLETE CBC W/AUTO DIFF WBC: CPT | Performed by: PSYCHIATRY & NEUROLOGY

## 2024-08-19 PROCEDURE — 36415 COLL VENOUS BLD VENIPUNCTURE: CPT | Performed by: PSYCHIATRY & NEUROLOGY

## 2024-08-19 PROCEDURE — 80053 COMPREHEN METABOLIC PANEL: CPT | Performed by: PSYCHIATRY & NEUROLOGY

## 2025-05-07 ENCOUNTER — RESULTS FOLLOW-UP (OUTPATIENT)
Dept: NEUROLOGY | Facility: CLINIC | Age: 60
End: 2025-05-07

## 2025-05-07 ENCOUNTER — LAB VISIT (OUTPATIENT)
Dept: LAB | Facility: HOSPITAL | Age: 60
End: 2025-05-07
Attending: NURSE PRACTITIONER
Payer: MEDICARE

## 2025-05-07 ENCOUNTER — OFFICE VISIT (OUTPATIENT)
Dept: NEUROLOGY | Facility: CLINIC | Age: 60
End: 2025-05-07
Payer: MEDICARE

## 2025-05-07 VITALS
SYSTOLIC BLOOD PRESSURE: 126 MMHG | HEIGHT: 71 IN | RESPIRATION RATE: 16 BRPM | OXYGEN SATURATION: 98 % | HEART RATE: 77 BPM | WEIGHT: 186.63 LBS | DIASTOLIC BLOOD PRESSURE: 94 MMHG | BODY MASS INDEX: 26.13 KG/M2

## 2025-05-07 DIAGNOSIS — G89.29 CHRONIC NONINTRACTABLE HEADACHE, UNSPECIFIED HEADACHE TYPE: Primary | ICD-10-CM

## 2025-05-07 DIAGNOSIS — R73.03 PREDIABETES: ICD-10-CM

## 2025-05-07 DIAGNOSIS — R51.9 CHRONIC NONINTRACTABLE HEADACHE, UNSPECIFIED HEADACHE TYPE: ICD-10-CM

## 2025-05-07 DIAGNOSIS — F20.9 SCHIZOPHRENIA, UNSPECIFIED TYPE: ICD-10-CM

## 2025-05-07 DIAGNOSIS — F19.11 HISTORY OF SUBSTANCE ABUSE: ICD-10-CM

## 2025-05-07 DIAGNOSIS — G43.709 CHRONIC MIGRAINE WITHOUT AURA WITHOUT STATUS MIGRAINOSUS, NOT INTRACTABLE: ICD-10-CM

## 2025-05-07 DIAGNOSIS — G89.29 CHRONIC NONINTRACTABLE HEADACHE, UNSPECIFIED HEADACHE TYPE: ICD-10-CM

## 2025-05-07 DIAGNOSIS — Z91.51 HISTORY OF SUICIDE ATTEMPT: ICD-10-CM

## 2025-05-07 DIAGNOSIS — R51.9 CHRONIC NONINTRACTABLE HEADACHE, UNSPECIFIED HEADACHE TYPE: Primary | ICD-10-CM

## 2025-05-07 DIAGNOSIS — I10 HYPERTENSION, UNSPECIFIED TYPE: ICD-10-CM

## 2025-05-07 DIAGNOSIS — E78.49 OTHER HYPERLIPIDEMIA: ICD-10-CM

## 2025-05-07 PROBLEM — E78.5 HLD (HYPERLIPIDEMIA): Status: ACTIVE | Noted: 2025-05-07

## 2025-05-07 PROBLEM — I95.9 HYPOTENSION: Status: RESOLVED | Noted: 2019-08-23 | Resolved: 2025-05-07

## 2025-05-07 PROBLEM — T44.7X2A SUICIDE ATTEMPT BY BETA BLOCKER OVERDOSE: Status: RESOLVED | Noted: 2019-08-23 | Resolved: 2025-05-07

## 2025-05-07 LAB
ABSOLUTE EOSINOPHIL (OHS): 0.27 K/UL
ABSOLUTE MONOCYTE (OHS): 0.78 K/UL (ref 0.3–1)
ABSOLUTE NEUTROPHIL COUNT (OHS): 5.42 K/UL (ref 1.8–7.7)
BASOPHILS # BLD AUTO: 0.05 K/UL
BASOPHILS NFR BLD AUTO: 0.6 %
ERYTHROCYTE [DISTWIDTH] IN BLOOD BY AUTOMATED COUNT: 12.4 % (ref 11.5–14.5)
ERYTHROCYTE [SEDIMENTATION RATE] IN BLOOD BY PHOTOMETRIC METHOD: 8 MM/HR
HCT VFR BLD AUTO: 44.2 % (ref 40–54)
HGB BLD-MCNC: 15.2 GM/DL (ref 14–18)
IMM GRANULOCYTES # BLD AUTO: 0.05 K/UL (ref 0–0.04)
IMM GRANULOCYTES NFR BLD AUTO: 0.6 % (ref 0–0.5)
LYMPHOCYTES # BLD AUTO: 2.26 K/UL (ref 1–4.8)
MCH RBC QN AUTO: 29.4 PG (ref 27–31)
MCHC RBC AUTO-ENTMCNC: 34.4 G/DL (ref 32–36)
MCV RBC AUTO: 86 FL (ref 82–98)
NUCLEATED RBC (/100WBC) (OHS): 0 /100 WBC
PLATELET # BLD AUTO: 252 K/UL (ref 150–450)
PMV BLD AUTO: 9.9 FL (ref 9.2–12.9)
RBC # BLD AUTO: 5.17 M/UL (ref 4.6–6.2)
RELATIVE EOSINOPHIL (OHS): 3.1 %
RELATIVE LYMPHOCYTE (OHS): 25.6 % (ref 18–48)
RELATIVE MONOCYTE (OHS): 8.8 % (ref 4–15)
RELATIVE NEUTROPHIL (OHS): 61.3 % (ref 38–73)
WBC # BLD AUTO: 8.83 K/UL (ref 3.9–12.7)

## 2025-05-07 PROCEDURE — 36415 COLL VENOUS BLD VENIPUNCTURE: CPT

## 2025-05-07 PROCEDURE — 85652 RBC SED RATE AUTOMATED: CPT

## 2025-05-07 PROCEDURE — 4010F ACE/ARB THERAPY RXD/TAKEN: CPT | Mod: CPTII,S$GLB,, | Performed by: NURSE PRACTITIONER

## 2025-05-07 PROCEDURE — 3074F SYST BP LT 130 MM HG: CPT | Mod: CPTII,S$GLB,, | Performed by: NURSE PRACTITIONER

## 2025-05-07 PROCEDURE — 3008F BODY MASS INDEX DOCD: CPT | Mod: CPTII,S$GLB,, | Performed by: NURSE PRACTITIONER

## 2025-05-07 PROCEDURE — 3080F DIAST BP >= 90 MM HG: CPT | Mod: CPTII,S$GLB,, | Performed by: NURSE PRACTITIONER

## 2025-05-07 PROCEDURE — 1159F MED LIST DOCD IN RCRD: CPT | Mod: CPTII,S$GLB,, | Performed by: NURSE PRACTITIONER

## 2025-05-07 PROCEDURE — 85025 COMPLETE CBC W/AUTO DIFF WBC: CPT

## 2025-05-07 PROCEDURE — 99999 PR PBB SHADOW E&M-EST. PATIENT-LVL III: CPT | Mod: PBBFAC,,, | Performed by: NURSE PRACTITIONER

## 2025-05-07 PROCEDURE — 99204 OFFICE O/P NEW MOD 45 MIN: CPT | Mod: S$GLB,,, | Performed by: NURSE PRACTITIONER

## 2025-05-07 RX ORDER — TAMSULOSIN HYDROCHLORIDE 0.4 MG/1
1 CAPSULE ORAL
COMMUNITY
Start: 2025-02-26

## 2025-05-07 RX ORDER — LOSARTAN POTASSIUM 50 MG/1
50 TABLET ORAL
COMMUNITY
Start: 2025-04-16

## 2025-05-07 RX ORDER — ALBUTEROL SULFATE 90 UG/1
2 INHALANT RESPIRATORY (INHALATION) EVERY 4 HOURS PRN
COMMUNITY
Start: 2025-04-16

## 2025-05-07 RX ORDER — BUSPIRONE HYDROCHLORIDE 10 MG/1
10 TABLET ORAL 2 TIMES DAILY
COMMUNITY
Start: 2025-04-29
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
## 2025-06-02 ENCOUNTER — DOCUMENTATION ONLY (OUTPATIENT)
Dept: NEUROLOGY | Facility: CLINIC | Age: 60
End: 2025-06-02
Payer: MEDICARE

## 2025-06-02 ENCOUNTER — TELEPHONE (OUTPATIENT)
Dept: NEUROLOGY | Facility: CLINIC | Age: 60
End: 2025-06-02
Payer: MEDICARE

## 2025-07-15 ENCOUNTER — LAB VISIT (OUTPATIENT)
Dept: LAB | Facility: HOSPITAL | Age: 60
End: 2025-07-15
Payer: MEDICARE

## 2025-07-15 DIAGNOSIS — Z79.899 POLYPHARMACY: Primary | ICD-10-CM

## 2025-07-15 LAB
ABSOLUTE EOSINOPHIL (OHS): 0.33 K/UL
ABSOLUTE MONOCYTE (OHS): 0.62 K/UL (ref 0.3–1)
ABSOLUTE NEUTROPHIL COUNT (OHS): 4.18 K/UL (ref 1.8–7.7)
ALBUMIN SERPL BCP-MCNC: 4.1 G/DL (ref 3.5–5.2)
ALP SERPL-CCNC: 84 UNIT/L (ref 40–150)
ALT SERPL W/O P-5'-P-CCNC: 64 UNIT/L (ref 10–44)
ANION GAP (OHS): 10 MMOL/L (ref 8–16)
AST SERPL-CCNC: 33 UNIT/L (ref 11–45)
BASOPHILS # BLD AUTO: 0.03 K/UL
BASOPHILS NFR BLD AUTO: 0.4 %
BILIRUB SERPL-MCNC: 0.6 MG/DL (ref 0.1–1)
BUN SERPL-MCNC: 17 MG/DL (ref 6–20)
CALCIUM SERPL-MCNC: 9.4 MG/DL (ref 8.7–10.5)
CHLORIDE SERPL-SCNC: 105 MMOL/L (ref 95–110)
CHOLEST SERPL-MCNC: 157 MG/DL (ref 120–199)
CHOLEST/HDLC SERPL: 4.4 {RATIO} (ref 2–5)
CO2 SERPL-SCNC: 22 MMOL/L (ref 23–29)
CREAT SERPL-MCNC: 1.3 MG/DL (ref 0.5–1.4)
EAG (OHS): 120 MG/DL (ref 68–131)
ERYTHROCYTE [DISTWIDTH] IN BLOOD BY AUTOMATED COUNT: 12.4 % (ref 11.5–14.5)
GFR SERPLBLD CREATININE-BSD FMLA CKD-EPI: >60 ML/MIN/1.73/M2
GLUCOSE SERPL-MCNC: 112 MG/DL (ref 70–110)
HBA1C MFR BLD: 5.8 % (ref 4–5.6)
HCT VFR BLD AUTO: 40.9 % (ref 40–54)
HDLC SERPL-MCNC: 36 MG/DL (ref 40–75)
HDLC SERPL: 22.9 % (ref 20–50)
HGB BLD-MCNC: 14.3 GM/DL (ref 14–18)
IMM GRANULOCYTES # BLD AUTO: 0.02 K/UL (ref 0–0.04)
IMM GRANULOCYTES NFR BLD AUTO: 0.3 % (ref 0–0.5)
LDLC SERPL CALC-MCNC: 82.6 MG/DL (ref 63–159)
LYMPHOCYTES # BLD AUTO: 2.42 K/UL (ref 1–4.8)
MCH RBC QN AUTO: 29.6 PG (ref 27–31)
MCHC RBC AUTO-ENTMCNC: 35 G/DL (ref 32–36)
MCV RBC AUTO: 85 FL (ref 82–98)
NONHDLC SERPL-MCNC: 121 MG/DL
NUCLEATED RBC (/100WBC) (OHS): 0 /100 WBC
PLATELET # BLD AUTO: 227 K/UL (ref 150–450)
PMV BLD AUTO: 10.2 FL (ref 9.2–12.9)
POTASSIUM SERPL-SCNC: 4.1 MMOL/L (ref 3.5–5.1)
PROT SERPL-MCNC: 7.9 GM/DL (ref 6–8.4)
RBC # BLD AUTO: 4.83 M/UL (ref 4.6–6.2)
RELATIVE EOSINOPHIL (OHS): 4.3 %
RELATIVE LYMPHOCYTE (OHS): 31.8 % (ref 18–48)
RELATIVE MONOCYTE (OHS): 8.2 % (ref 4–15)
RELATIVE NEUTROPHIL (OHS): 55 % (ref 38–73)
SODIUM SERPL-SCNC: 137 MMOL/L (ref 136–145)
TRIGL SERPL-MCNC: 192 MG/DL (ref 30–150)
WBC # BLD AUTO: 7.6 K/UL (ref 3.9–12.7)

## 2025-07-15 PROCEDURE — 84075 ASSAY ALKALINE PHOSPHATASE: CPT

## 2025-07-15 PROCEDURE — 85025 COMPLETE CBC W/AUTO DIFF WBC: CPT

## 2025-07-15 PROCEDURE — 80061 LIPID PANEL: CPT

## 2025-07-15 PROCEDURE — 83036 HEMOGLOBIN GLYCOSYLATED A1C: CPT

## 2025-07-15 PROCEDURE — 36415 COLL VENOUS BLD VENIPUNCTURE: CPT

## 2025-08-27 ENCOUNTER — LAB VISIT (OUTPATIENT)
Dept: LAB | Facility: HOSPITAL | Age: 60
End: 2025-08-27
Attending: INTERNAL MEDICINE
Payer: MEDICARE

## 2025-08-27 DIAGNOSIS — E55.9 VITAMIN D DEFICIENCY: ICD-10-CM

## 2025-08-27 DIAGNOSIS — I25.10 CORONARY ARTERY DISEASE, UNSPECIFIED VESSEL OR LESION TYPE, UNSPECIFIED WHETHER ANGINA PRESENT, UNSPECIFIED WHETHER NATIVE OR TRANSPLANTED HEART: Primary | ICD-10-CM

## 2025-08-27 DIAGNOSIS — E78.5 HYPERLIPIDEMIA, UNSPECIFIED HYPERLIPIDEMIA TYPE: ICD-10-CM

## 2025-08-27 DIAGNOSIS — I10 HYPERTENSION, UNSPECIFIED TYPE: ICD-10-CM

## 2025-08-27 LAB
ALBUMIN SERPL BCP-MCNC: 3.6 G/DL (ref 3.5–5.2)
ALP SERPL-CCNC: 91 UNIT/L (ref 40–150)
ALT SERPL W/O P-5'-P-CCNC: 48 UNIT/L (ref 10–44)
ANION GAP (OHS): 11 MMOL/L (ref 8–16)
AST SERPL-CCNC: 20 UNIT/L (ref 11–45)
BILIRUB DIRECT SERPL-MCNC: 0.1 MG/DL (ref 0.1–0.3)
BILIRUB SERPL-MCNC: 0.3 MG/DL (ref 0.1–1)
BUN SERPL-MCNC: 13 MG/DL (ref 6–20)
CALCIUM SERPL-MCNC: 9.4 MG/DL (ref 8.7–10.5)
CHLORIDE SERPL-SCNC: 108 MMOL/L (ref 95–110)
CHOLEST SERPL-MCNC: 116 MG/DL (ref 120–199)
CHOLEST/HDLC SERPL: 4.1 {RATIO} (ref 2–5)
CO2 SERPL-SCNC: 23 MMOL/L (ref 23–29)
CREAT SERPL-MCNC: 1.2 MG/DL (ref 0.5–1.4)
ERYTHROCYTE [DISTWIDTH] IN BLOOD BY AUTOMATED COUNT: 12.5 % (ref 11.5–14.5)
GFR SERPLBLD CREATININE-BSD FMLA CKD-EPI: >60 ML/MIN/1.73/M2
GLUCOSE SERPL-MCNC: 122 MG/DL (ref 70–110)
HCT VFR BLD AUTO: 35.8 % (ref 40–54)
HDLC SERPL-MCNC: 28 MG/DL (ref 40–75)
HDLC SERPL: 24.1 % (ref 20–50)
HGB BLD-MCNC: 12.3 GM/DL (ref 14–18)
LDLC SERPL CALC-MCNC: 49.8 MG/DL (ref 63–159)
MCH RBC QN AUTO: 29.1 PG (ref 27–31)
MCHC RBC AUTO-ENTMCNC: 34.4 G/DL (ref 32–36)
MCV RBC AUTO: 85 FL (ref 82–98)
NONHDLC SERPL-MCNC: 88 MG/DL
NT-PROBNP SERPL-MCNC: 39 PG/ML
PLATELET # BLD AUTO: 258 K/UL (ref 150–450)
PMV BLD AUTO: 9.5 FL (ref 9.2–12.9)
POTASSIUM SERPL-SCNC: 4 MMOL/L (ref 3.5–5.1)
PROT SERPL-MCNC: 6.8 GM/DL (ref 6–8.4)
RBC # BLD AUTO: 4.23 M/UL (ref 4.6–6.2)
SODIUM SERPL-SCNC: 142 MMOL/L (ref 136–145)
TRIGL SERPL-MCNC: 191 MG/DL (ref 30–150)
WBC # BLD AUTO: 7.3 K/UL (ref 3.9–12.7)

## 2025-08-27 PROCEDURE — 82248 BILIRUBIN DIRECT: CPT

## 2025-08-27 PROCEDURE — 85027 COMPLETE CBC AUTOMATED: CPT

## 2025-08-27 PROCEDURE — 82306 VITAMIN D 25 HYDROXY: CPT

## 2025-08-27 PROCEDURE — 83880 ASSAY OF NATRIURETIC PEPTIDE: CPT

## 2025-08-27 PROCEDURE — 80061 LIPID PANEL: CPT

## 2025-08-27 PROCEDURE — 80053 COMPREHEN METABOLIC PANEL: CPT

## 2025-08-27 PROCEDURE — 36415 COLL VENOUS BLD VENIPUNCTURE: CPT

## 2025-08-28 LAB — 25(OH)D3+25(OH)D2 SERPL-MCNC: 36 NG/ML (ref 30–96)
